# Patient Record
Sex: FEMALE | Race: WHITE | NOT HISPANIC OR LATINO | Employment: FULL TIME | URBAN - METROPOLITAN AREA
[De-identification: names, ages, dates, MRNs, and addresses within clinical notes are randomized per-mention and may not be internally consistent; named-entity substitution may affect disease eponyms.]

---

## 2017-02-23 ENCOUNTER — COMMUNICATION - HEALTHEAST (OUTPATIENT)
Dept: FAMILY MEDICINE | Facility: CLINIC | Age: 52
End: 2017-02-23

## 2017-04-10 ENCOUNTER — COMMUNICATION - HEALTHEAST (OUTPATIENT)
Dept: FAMILY MEDICINE | Facility: CLINIC | Age: 52
End: 2017-04-10

## 2017-06-14 ENCOUNTER — COMMUNICATION - HEALTHEAST (OUTPATIENT)
Dept: FAMILY MEDICINE | Facility: CLINIC | Age: 52
End: 2017-06-14

## 2017-06-14 DIAGNOSIS — E03.9 HYPOTHYROIDISM: ICD-10-CM

## 2017-06-20 ENCOUNTER — COMMUNICATION - HEALTHEAST (OUTPATIENT)
Dept: FAMILY MEDICINE | Facility: CLINIC | Age: 52
End: 2017-06-20

## 2017-07-03 ENCOUNTER — AMBULATORY - HEALTHEAST (OUTPATIENT)
Dept: LAB | Facility: CLINIC | Age: 52
End: 2017-07-03

## 2017-07-03 ENCOUNTER — AMBULATORY - HEALTHEAST (OUTPATIENT)
Dept: FAMILY MEDICINE | Facility: CLINIC | Age: 52
End: 2017-07-03

## 2017-07-03 DIAGNOSIS — E03.9 HYPOTHYROIDISM: ICD-10-CM

## 2017-09-18 ENCOUNTER — COMMUNICATION - HEALTHEAST (OUTPATIENT)
Dept: FAMILY MEDICINE | Facility: CLINIC | Age: 52
End: 2017-09-18

## 2017-09-18 DIAGNOSIS — E03.9 HYPOTHYROIDISM: ICD-10-CM

## 2017-10-06 ENCOUNTER — COMMUNICATION - HEALTHEAST (OUTPATIENT)
Dept: FAMILY MEDICINE | Facility: CLINIC | Age: 52
End: 2017-10-06

## 2017-10-08 ENCOUNTER — AMBULATORY - HEALTHEAST (OUTPATIENT)
Dept: FAMILY MEDICINE | Facility: CLINIC | Age: 52
End: 2017-10-08

## 2017-10-31 ENCOUNTER — OFFICE VISIT - HEALTHEAST (OUTPATIENT)
Dept: FAMILY MEDICINE | Facility: CLINIC | Age: 52
End: 2017-10-31

## 2017-10-31 DIAGNOSIS — N39.0 UTI (URINARY TRACT INFECTION): ICD-10-CM

## 2017-10-31 DIAGNOSIS — R30.0 DYSURIA: ICD-10-CM

## 2017-11-23 ENCOUNTER — COMMUNICATION - HEALTHEAST (OUTPATIENT)
Dept: FAMILY MEDICINE | Facility: CLINIC | Age: 52
End: 2017-11-23

## 2017-11-23 DIAGNOSIS — I10 ESSENTIAL HYPERTENSION: ICD-10-CM

## 2017-12-18 ENCOUNTER — COMMUNICATION - HEALTHEAST (OUTPATIENT)
Dept: FAMILY MEDICINE | Facility: CLINIC | Age: 52
End: 2017-12-18

## 2017-12-18 DIAGNOSIS — E03.9 HYPOTHYROIDISM: ICD-10-CM

## 2018-02-13 ENCOUNTER — AMBULATORY - HEALTHEAST (OUTPATIENT)
Dept: MULTI SPECIALTY CLINIC | Facility: CLINIC | Age: 53
End: 2018-02-13

## 2018-02-13 LAB
HPV_EXT - HISTORICAL: NORMAL
PAP SMEAR - HIM PATIENT REPORTED: NORMAL

## 2018-02-23 ENCOUNTER — COMMUNICATION - HEALTHEAST (OUTPATIENT)
Dept: FAMILY MEDICINE | Facility: CLINIC | Age: 53
End: 2018-02-23

## 2018-02-23 DIAGNOSIS — I10 ESSENTIAL HYPERTENSION: ICD-10-CM

## 2018-02-26 ENCOUNTER — COMMUNICATION - HEALTHEAST (OUTPATIENT)
Dept: FAMILY MEDICINE | Facility: CLINIC | Age: 53
End: 2018-02-26

## 2018-03-01 ENCOUNTER — OFFICE VISIT - HEALTHEAST (OUTPATIENT)
Dept: FAMILY MEDICINE | Facility: CLINIC | Age: 53
End: 2018-03-01

## 2018-03-01 DIAGNOSIS — N18.30 CHRONIC KIDNEY DISEASE, STAGE III (MODERATE) (H): ICD-10-CM

## 2018-03-01 DIAGNOSIS — I10 ESSENTIAL HYPERTENSION: ICD-10-CM

## 2018-03-01 DIAGNOSIS — E03.9 ACQUIRED HYPOTHYROIDISM: ICD-10-CM

## 2018-03-01 DIAGNOSIS — N39.0 RECURRENT UTI: ICD-10-CM

## 2018-03-01 DIAGNOSIS — Z00.00 ROUTINE GENERAL MEDICAL EXAMINATION AT A HEALTH CARE FACILITY: ICD-10-CM

## 2018-03-01 LAB
ALBUMIN SERPL-MCNC: 4.2 G/DL (ref 3.5–5)
ALBUMIN UR-MCNC: NEGATIVE MG/DL
ANION GAP SERPL CALCULATED.3IONS-SCNC: 10 MMOL/L (ref 5–18)
APPEARANCE UR: CLEAR
BACTERIA #/AREA URNS HPF: ABNORMAL HPF
BILIRUB UR QL STRIP: NEGATIVE
BUN SERPL-MCNC: 10 MG/DL (ref 8–22)
CALCIUM SERPL-MCNC: 9.9 MG/DL (ref 8.5–10.5)
CHLORIDE BLD-SCNC: 98 MMOL/L (ref 98–107)
CHOLEST SERPL-MCNC: 203 MG/DL
CO2 SERPL-SCNC: 27 MMOL/L (ref 22–31)
COLOR UR AUTO: YELLOW
CREAT SERPL-MCNC: 1.04 MG/DL (ref 0.6–1.1)
ERYTHROCYTE [DISTWIDTH] IN BLOOD BY AUTOMATED COUNT: 13.1 % (ref 11–14.5)
FASTING STATUS PATIENT QL REPORTED: YES
GFR SERPL CREATININE-BSD FRML MDRD: 56 ML/MIN/1.73M2
GLUCOSE BLD-MCNC: 99 MG/DL (ref 70–125)
GLUCOSE UR STRIP-MCNC: NEGATIVE MG/DL
HCT VFR BLD AUTO: 42.9 % (ref 35–47)
HDLC SERPL-MCNC: 57 MG/DL
HGB BLD-MCNC: 14.1 G/DL (ref 12–16)
HGB UR QL STRIP: ABNORMAL
KETONES UR STRIP-MCNC: NEGATIVE MG/DL
LDLC SERPL CALC-MCNC: 123 MG/DL
LEUKOCYTE ESTERASE UR QL STRIP: NEGATIVE
MCH RBC QN AUTO: 29.4 PG (ref 27–34)
MCHC RBC AUTO-ENTMCNC: 32.9 G/DL (ref 32–36)
MCV RBC AUTO: 89 FL (ref 80–100)
MUCOUS THREADS #/AREA URNS LPF: ABNORMAL LPF
NITRATE UR QL: NEGATIVE
PH UR STRIP: 7 [PH] (ref 5–8)
PHOSPHATE SERPL-MCNC: 2.6 MG/DL (ref 2.5–4.5)
PLATELET # BLD AUTO: 217 THOU/UL (ref 140–440)
PMV BLD AUTO: 8.8 FL (ref 7–10)
POTASSIUM BLD-SCNC: 3.9 MMOL/L (ref 3.5–5)
PTH-INTACT SERPL-MCNC: 28 PG/ML (ref 10–86)
RBC # BLD AUTO: 4.8 MILL/UL (ref 3.8–5.4)
RBC #/AREA URNS AUTO: ABNORMAL HPF
SODIUM SERPL-SCNC: 135 MMOL/L (ref 136–145)
SP GR UR STRIP: 1.01 (ref 1–1.03)
SQUAMOUS #/AREA URNS AUTO: ABNORMAL LPF
TRIGL SERPL-MCNC: 116 MG/DL
TSH SERPL DL<=0.005 MIU/L-ACNC: 4.66 UIU/ML (ref 0.3–5)
URATE SERPL-MCNC: 4.9 MG/DL (ref 2–7.5)
UROBILINOGEN UR STRIP-ACNC: ABNORMAL
WBC #/AREA URNS AUTO: ABNORMAL HPF
WBC: 3.9 THOU/UL (ref 4–11)

## 2018-03-01 ASSESSMENT — MIFFLIN-ST. JEOR: SCORE: 1551.73

## 2018-03-02 LAB
25(OH)D3 SERPL-MCNC: 45.7 NG/ML (ref 30–80)
25(OH)D3 SERPL-MCNC: 45.7 NG/ML (ref 30–80)
BACTERIA SPEC CULT: NO GROWTH

## 2018-04-20 ENCOUNTER — RECORDS - HEALTHEAST (OUTPATIENT)
Dept: ADMINISTRATIVE | Facility: OTHER | Age: 53
End: 2018-04-20

## 2018-04-23 ENCOUNTER — RECORDS - HEALTHEAST (OUTPATIENT)
Dept: ADMINISTRATIVE | Facility: OTHER | Age: 53
End: 2018-04-23

## 2018-05-21 ENCOUNTER — AMBULATORY - HEALTHEAST (OUTPATIENT)
Dept: LAB | Facility: CLINIC | Age: 53
End: 2018-05-21

## 2018-05-21 ENCOUNTER — COMMUNICATION - HEALTHEAST (OUTPATIENT)
Dept: FAMILY MEDICINE | Facility: CLINIC | Age: 53
End: 2018-05-21

## 2018-05-21 DIAGNOSIS — N39.0 RECURRENT UTI: ICD-10-CM

## 2018-05-21 LAB
ALBUMIN UR-MCNC: ABNORMAL MG/DL
APPEARANCE UR: CLEAR
BACTERIA #/AREA URNS HPF: ABNORMAL HPF
BILIRUB UR QL STRIP: NEGATIVE
COLOR UR AUTO: YELLOW
GLUCOSE UR STRIP-MCNC: NEGATIVE MG/DL
HGB UR QL STRIP: ABNORMAL
KETONES UR STRIP-MCNC: NEGATIVE MG/DL
LEUKOCYTE ESTERASE UR QL STRIP: ABNORMAL
NITRATE UR QL: NEGATIVE
PH UR STRIP: 6 [PH] (ref 5–8)
RBC #/AREA URNS AUTO: ABNORMAL HPF
SP GR UR STRIP: <=1.005 (ref 1–1.03)
SQUAMOUS #/AREA URNS AUTO: ABNORMAL LPF
UROBILINOGEN UR STRIP-ACNC: ABNORMAL
WBC #/AREA URNS AUTO: ABNORMAL HPF

## 2018-05-23 LAB — BACTERIA SPEC CULT: ABNORMAL

## 2018-05-29 ENCOUNTER — COMMUNICATION - HEALTHEAST (OUTPATIENT)
Dept: FAMILY MEDICINE | Facility: CLINIC | Age: 53
End: 2018-05-29

## 2018-05-29 DIAGNOSIS — I10 ESSENTIAL HYPERTENSION: ICD-10-CM

## 2018-07-15 ENCOUNTER — OFFICE VISIT - HEALTHEAST (OUTPATIENT)
Dept: FAMILY MEDICINE | Facility: CLINIC | Age: 53
End: 2018-07-15

## 2018-07-15 DIAGNOSIS — R30.0 DYSURIA: ICD-10-CM

## 2018-07-15 DIAGNOSIS — N39.0 URINARY TRACT INFECTION: ICD-10-CM

## 2018-07-15 LAB
ALBUMIN UR-MCNC: ABNORMAL MG/DL
APPEARANCE UR: CLEAR
BACTERIA #/AREA URNS HPF: ABNORMAL HPF
BILIRUB UR QL STRIP: NEGATIVE
COLOR UR AUTO: YELLOW
GLUCOSE UR STRIP-MCNC: NEGATIVE MG/DL
HGB UR QL STRIP: ABNORMAL
KETONES UR STRIP-MCNC: NEGATIVE MG/DL
LEUKOCYTE ESTERASE UR QL STRIP: ABNORMAL
NITRATE UR QL: NEGATIVE
PH UR STRIP: 7 [PH] (ref 5–8)
RBC #/AREA URNS AUTO: ABNORMAL HPF
SP GR UR STRIP: 1.01 (ref 1–1.03)
SQUAMOUS #/AREA URNS AUTO: ABNORMAL LPF
UROBILINOGEN UR STRIP-ACNC: ABNORMAL
WBC #/AREA URNS AUTO: ABNORMAL HPF

## 2018-07-16 LAB — BACTERIA SPEC CULT: ABNORMAL

## 2018-10-09 ENCOUNTER — AMBULATORY - HEALTHEAST (OUTPATIENT)
Dept: FAMILY MEDICINE | Facility: CLINIC | Age: 53
End: 2018-10-09

## 2018-10-09 ENCOUNTER — AMBULATORY - HEALTHEAST (OUTPATIENT)
Dept: LAB | Facility: CLINIC | Age: 53
End: 2018-10-09

## 2018-10-09 ENCOUNTER — COMMUNICATION - HEALTHEAST (OUTPATIENT)
Dept: FAMILY MEDICINE | Facility: CLINIC | Age: 53
End: 2018-10-09

## 2018-10-09 DIAGNOSIS — N39.0 RECURRENT UTI: ICD-10-CM

## 2018-10-09 DIAGNOSIS — N39.0 URINARY TRACT INFECTION: ICD-10-CM

## 2018-10-09 DIAGNOSIS — R30.0 DYSURIA: ICD-10-CM

## 2018-10-09 LAB
ALBUMIN UR-MCNC: ABNORMAL MG/DL
APPEARANCE UR: CLEAR
BACTERIA #/AREA URNS HPF: ABNORMAL HPF
BILIRUB UR QL STRIP: NEGATIVE
COLOR UR AUTO: YELLOW
GLUCOSE UR STRIP-MCNC: NEGATIVE MG/DL
HGB UR QL STRIP: ABNORMAL
KETONES UR STRIP-MCNC: NEGATIVE MG/DL
LEUKOCYTE ESTERASE UR QL STRIP: ABNORMAL
NITRATE UR QL: NEGATIVE
PH UR STRIP: 7.5 [PH] (ref 5–8)
RBC #/AREA URNS AUTO: ABNORMAL HPF
SP GR UR STRIP: 1.02 (ref 1–1.03)
SQUAMOUS #/AREA URNS AUTO: ABNORMAL LPF
UROBILINOGEN UR STRIP-ACNC: ABNORMAL
WBC #/AREA URNS AUTO: >100 HPF
WBC CLUMPS #/AREA URNS HPF: PRESENT /[HPF]

## 2018-10-10 LAB — BACTERIA SPEC CULT: ABNORMAL

## 2018-10-24 ENCOUNTER — COMMUNICATION - HEALTHEAST (OUTPATIENT)
Dept: FAMILY MEDICINE | Facility: CLINIC | Age: 53
End: 2018-10-24

## 2018-10-24 DIAGNOSIS — E03.9 HYPOTHYROIDISM: ICD-10-CM

## 2019-01-21 ENCOUNTER — COMMUNICATION - HEALTHEAST (OUTPATIENT)
Dept: FAMILY MEDICINE | Facility: CLINIC | Age: 54
End: 2019-01-21

## 2019-01-21 DIAGNOSIS — E03.9 HYPOTHYROIDISM: ICD-10-CM

## 2019-02-16 ENCOUNTER — COMMUNICATION - HEALTHEAST (OUTPATIENT)
Dept: FAMILY MEDICINE | Facility: CLINIC | Age: 54
End: 2019-02-16

## 2019-02-16 DIAGNOSIS — I10 ESSENTIAL HYPERTENSION: ICD-10-CM

## 2019-04-10 ENCOUNTER — OFFICE VISIT - HEALTHEAST (OUTPATIENT)
Dept: FAMILY MEDICINE | Facility: CLINIC | Age: 54
End: 2019-04-10

## 2019-04-10 DIAGNOSIS — J01.00 ACUTE NON-RECURRENT MAXILLARY SINUSITIS: ICD-10-CM

## 2019-04-10 ASSESSMENT — MIFFLIN-ST. JEOR: SCORE: 1537.1

## 2019-04-11 ENCOUNTER — AMBULATORY - HEALTHEAST (OUTPATIENT)
Dept: MULTI SPECIALTY CLINIC | Facility: CLINIC | Age: 54
End: 2019-04-11

## 2019-04-18 ENCOUNTER — COMMUNICATION - HEALTHEAST (OUTPATIENT)
Dept: FAMILY MEDICINE | Facility: CLINIC | Age: 54
End: 2019-04-18

## 2019-04-18 DIAGNOSIS — E03.9 HYPOTHYROIDISM: ICD-10-CM

## 2019-04-22 ENCOUNTER — COMMUNICATION - HEALTHEAST (OUTPATIENT)
Dept: FAMILY MEDICINE | Facility: CLINIC | Age: 54
End: 2019-04-22

## 2019-05-17 ENCOUNTER — COMMUNICATION - HEALTHEAST (OUTPATIENT)
Dept: FAMILY MEDICINE | Facility: CLINIC | Age: 54
End: 2019-05-17

## 2019-05-17 DIAGNOSIS — I10 ESSENTIAL HYPERTENSION: ICD-10-CM

## 2019-06-18 ENCOUNTER — OFFICE VISIT - HEALTHEAST (OUTPATIENT)
Dept: FAMILY MEDICINE | Facility: CLINIC | Age: 54
End: 2019-06-18

## 2019-06-18 DIAGNOSIS — N39.0 URINARY TRACT INFECTION: ICD-10-CM

## 2019-06-18 LAB
ALBUMIN UR-MCNC: ABNORMAL MG/DL
APPEARANCE UR: ABNORMAL
BACTERIA #/AREA URNS HPF: ABNORMAL HPF
BILIRUB UR QL STRIP: NEGATIVE
COLOR UR AUTO: YELLOW
GLUCOSE UR STRIP-MCNC: NEGATIVE MG/DL
HGB UR QL STRIP: ABNORMAL
KETONES UR STRIP-MCNC: NEGATIVE MG/DL
LEUKOCYTE ESTERASE UR QL STRIP: ABNORMAL
NITRATE UR QL: NEGATIVE
PH UR STRIP: 7 [PH] (ref 5–8)
RBC #/AREA URNS AUTO: ABNORMAL HPF
SP GR UR STRIP: 1.01 (ref 1–1.03)
SQUAMOUS #/AREA URNS AUTO: ABNORMAL LPF
UROBILINOGEN UR STRIP-ACNC: ABNORMAL
WBC #/AREA URNS AUTO: ABNORMAL HPF
WBC CLUMPS #/AREA URNS HPF: PRESENT /[HPF]
YEAST #/AREA URNS HPF: ABNORMAL HPF

## 2019-06-19 LAB — BACTERIA SPEC CULT: NO GROWTH

## 2019-07-08 ENCOUNTER — OFFICE VISIT - HEALTHEAST (OUTPATIENT)
Dept: FAMILY MEDICINE | Facility: CLINIC | Age: 54
End: 2019-07-08

## 2019-07-08 ENCOUNTER — COMMUNICATION - HEALTHEAST (OUTPATIENT)
Dept: FAMILY MEDICINE | Facility: CLINIC | Age: 54
End: 2019-07-08

## 2019-07-08 DIAGNOSIS — N39.0 RECURRENT UTI: ICD-10-CM

## 2019-07-08 DIAGNOSIS — I10 ESSENTIAL HYPERTENSION: ICD-10-CM

## 2019-07-08 DIAGNOSIS — N18.30 CHRONIC KIDNEY DISEASE, STAGE III (MODERATE) (H): ICD-10-CM

## 2019-07-08 DIAGNOSIS — E03.9 ACQUIRED HYPOTHYROIDISM: ICD-10-CM

## 2019-07-08 LAB
ALBUMIN SERPL-MCNC: 4.3 G/DL (ref 3.5–5)
ALBUMIN UR-MCNC: NEGATIVE MG/DL
ANION GAP SERPL CALCULATED.3IONS-SCNC: 15 MMOL/L (ref 5–18)
APPEARANCE UR: CLEAR
BACTERIA #/AREA URNS HPF: ABNORMAL HPF
BILIRUB UR QL STRIP: NEGATIVE
BUN SERPL-MCNC: 18 MG/DL (ref 8–22)
CALCIUM SERPL-MCNC: 10.1 MG/DL (ref 8.5–10.5)
CHLORIDE BLD-SCNC: 104 MMOL/L (ref 98–107)
CO2 SERPL-SCNC: 24 MMOL/L (ref 22–31)
COLOR UR AUTO: YELLOW
CREAT SERPL-MCNC: 1.01 MG/DL (ref 0.6–1.1)
ERYTHROCYTE [DISTWIDTH] IN BLOOD BY AUTOMATED COUNT: 12.3 % (ref 11–14.5)
GFR SERPL CREATININE-BSD FRML MDRD: 58 ML/MIN/1.73M2
GLUCOSE BLD-MCNC: 97 MG/DL (ref 70–125)
GLUCOSE UR STRIP-MCNC: NEGATIVE MG/DL
HBA1C MFR BLD: 5.4 % (ref 3.5–6)
HCT VFR BLD AUTO: 42.2 % (ref 35–47)
HGB BLD-MCNC: 13.9 G/DL (ref 12–16)
HGB UR QL STRIP: ABNORMAL
KETONES UR STRIP-MCNC: NEGATIVE MG/DL
LEUKOCYTE ESTERASE UR QL STRIP: NEGATIVE
MCH RBC QN AUTO: 29.8 PG (ref 27–34)
MCHC RBC AUTO-ENTMCNC: 33 G/DL (ref 32–36)
MCV RBC AUTO: 90 FL (ref 80–100)
NITRATE UR QL: NEGATIVE
PH UR STRIP: 7 [PH] (ref 5–8)
PHOSPHATE SERPL-MCNC: 3.7 MG/DL (ref 2.5–4.5)
PLATELET # BLD AUTO: 217 THOU/UL (ref 140–440)
PMV BLD AUTO: 8.7 FL (ref 7–10)
POTASSIUM BLD-SCNC: 4.1 MMOL/L (ref 3.5–5)
PTH-INTACT SERPL-MCNC: 43 PG/ML (ref 10–86)
RBC # BLD AUTO: 4.66 MILL/UL (ref 3.8–5.4)
RBC #/AREA URNS AUTO: ABNORMAL HPF
SODIUM SERPL-SCNC: 143 MMOL/L (ref 136–145)
SP GR UR STRIP: 1.01 (ref 1–1.03)
SQUAMOUS #/AREA URNS AUTO: ABNORMAL LPF
TRANS CELLS #/AREA URNS HPF: ABNORMAL LPF
TSH SERPL DL<=0.005 MIU/L-ACNC: 2.79 UIU/ML (ref 0.3–5)
URATE SERPL-MCNC: 5.5 MG/DL (ref 2–7.5)
UROBILINOGEN UR STRIP-ACNC: ABNORMAL
WBC #/AREA URNS AUTO: ABNORMAL HPF
WBC: 4.2 THOU/UL (ref 4–11)

## 2019-07-08 ASSESSMENT — MIFFLIN-ST. JEOR: SCORE: 1557.97

## 2019-07-09 LAB
25(OH)D3 SERPL-MCNC: 33.9 NG/ML (ref 30–80)
25(OH)D3 SERPL-MCNC: 33.9 NG/ML (ref 30–80)

## 2019-07-14 ENCOUNTER — COMMUNICATION - HEALTHEAST (OUTPATIENT)
Dept: FAMILY MEDICINE | Facility: CLINIC | Age: 54
End: 2019-07-14

## 2019-07-14 DIAGNOSIS — E03.9 HYPOTHYROIDISM: ICD-10-CM

## 2019-07-19 ENCOUNTER — COMMUNICATION - HEALTHEAST (OUTPATIENT)
Dept: SCHEDULING | Facility: CLINIC | Age: 54
End: 2019-07-19

## 2019-07-19 ENCOUNTER — OFFICE VISIT - HEALTHEAST (OUTPATIENT)
Dept: FAMILY MEDICINE | Facility: CLINIC | Age: 54
End: 2019-07-19

## 2019-07-19 DIAGNOSIS — K52.9 COLITIS: ICD-10-CM

## 2019-07-19 DIAGNOSIS — R55 PRE-SYNCOPE: ICD-10-CM

## 2019-07-19 DIAGNOSIS — K92.1 BLOODY STOOLS: ICD-10-CM

## 2019-07-19 LAB
BASOPHILS # BLD AUTO: 0.1 THOU/UL (ref 0–0.2)
BASOPHILS NFR BLD AUTO: 1 % (ref 0–2)
EOSINOPHIL # BLD AUTO: 0.1 THOU/UL (ref 0–0.4)
EOSINOPHIL NFR BLD AUTO: 1 % (ref 0–6)
ERYTHROCYTE [DISTWIDTH] IN BLOOD BY AUTOMATED COUNT: 12 % (ref 11–14.5)
HCT VFR BLD AUTO: 41.8 % (ref 35–47)
HEMOCCULT SP1 STL QL: POSITIVE
HGB BLD-MCNC: 14 G/DL (ref 12–16)
LYMPHOCYTES # BLD AUTO: 1.4 THOU/UL (ref 0.8–4.4)
LYMPHOCYTES NFR BLD AUTO: 14 % (ref 20–40)
MCH RBC QN AUTO: 29.7 PG (ref 27–34)
MCHC RBC AUTO-ENTMCNC: 33.5 G/DL (ref 32–36)
MCV RBC AUTO: 89 FL (ref 80–100)
MONOCYTES # BLD AUTO: 0.5 THOU/UL (ref 0–0.9)
MONOCYTES NFR BLD AUTO: 5 % (ref 2–10)
NEUTROPHILS # BLD AUTO: 7.8 THOU/UL (ref 2–7.7)
NEUTROPHILS NFR BLD AUTO: 79 % (ref 50–70)
PLATELET # BLD AUTO: 219 THOU/UL (ref 140–440)
PMV BLD AUTO: 7.7 FL (ref 7–10)
RBC # BLD AUTO: 4.71 MILL/UL (ref 3.8–5.4)
WBC: 9.9 THOU/UL (ref 4–11)

## 2019-07-23 ENCOUNTER — COMMUNICATION - HEALTHEAST (OUTPATIENT)
Dept: SCHEDULING | Facility: CLINIC | Age: 54
End: 2019-07-23

## 2019-07-24 ENCOUNTER — RECORDS - HEALTHEAST (OUTPATIENT)
Dept: ADMINISTRATIVE | Facility: OTHER | Age: 54
End: 2019-07-24

## 2019-08-16 ENCOUNTER — COMMUNICATION - HEALTHEAST (OUTPATIENT)
Dept: FAMILY MEDICINE | Facility: CLINIC | Age: 54
End: 2019-08-16

## 2019-08-16 DIAGNOSIS — I10 ESSENTIAL HYPERTENSION: ICD-10-CM

## 2019-11-29 ENCOUNTER — COMMUNICATION - HEALTHEAST (OUTPATIENT)
Dept: SCHEDULING | Facility: CLINIC | Age: 54
End: 2019-11-29

## 2019-11-29 ENCOUNTER — OFFICE VISIT - HEALTHEAST (OUTPATIENT)
Dept: FAMILY MEDICINE | Facility: CLINIC | Age: 54
End: 2019-11-29

## 2019-11-29 DIAGNOSIS — R10.9 FLANK PAIN: ICD-10-CM

## 2019-11-29 DIAGNOSIS — I10 ESSENTIAL HYPERTENSION: ICD-10-CM

## 2019-11-29 LAB
ALBUMIN SERPL-MCNC: 4.5 G/DL (ref 3.5–5)
ALBUMIN UR-MCNC: NEGATIVE MG/DL
ALP SERPL-CCNC: 85 U/L (ref 45–120)
ALT SERPL W P-5'-P-CCNC: 19 U/L (ref 0–45)
ANION GAP SERPL CALCULATED.3IONS-SCNC: 13 MMOL/L (ref 5–18)
APPEARANCE UR: CLEAR
AST SERPL W P-5'-P-CCNC: 22 U/L (ref 0–40)
BASOPHILS # BLD AUTO: 0 THOU/UL (ref 0–0.2)
BASOPHILS NFR BLD AUTO: 0 % (ref 0–2)
BILIRUB SERPL-MCNC: 0.6 MG/DL (ref 0–1)
BILIRUB UR QL STRIP: NEGATIVE
BUN SERPL-MCNC: 16 MG/DL (ref 8–22)
CALCIUM SERPL-MCNC: 10.3 MG/DL (ref 8.5–10.5)
CHLORIDE BLD-SCNC: 99 MMOL/L (ref 98–107)
CO2 SERPL-SCNC: 27 MMOL/L (ref 22–31)
COLOR UR AUTO: YELLOW
CREAT SERPL-MCNC: 1.19 MG/DL (ref 0.6–1.1)
EOSINOPHIL # BLD AUTO: 0.2 THOU/UL (ref 0–0.4)
EOSINOPHIL NFR BLD AUTO: 3 % (ref 0–6)
ERYTHROCYTE [DISTWIDTH] IN BLOOD BY AUTOMATED COUNT: 13.2 % (ref 11–14.5)
GFR SERPL CREATININE-BSD FRML MDRD: 48 ML/MIN/1.73M2
GLUCOSE BLD-MCNC: 98 MG/DL (ref 70–125)
GLUCOSE UR STRIP-MCNC: NEGATIVE MG/DL
HCT VFR BLD AUTO: 41.5 % (ref 35–47)
HGB BLD-MCNC: 13.8 G/DL (ref 12–16)
HGB UR QL STRIP: NEGATIVE
KETONES UR STRIP-MCNC: NEGATIVE MG/DL
LEUKOCYTE ESTERASE UR QL STRIP: NEGATIVE
LIPASE SERPL-CCNC: 12 U/L (ref 0–52)
LYMPHOCYTES # BLD AUTO: 1.5 THOU/UL (ref 0.8–4.4)
LYMPHOCYTES NFR BLD AUTO: 27 % (ref 20–40)
MCH RBC QN AUTO: 29.5 PG (ref 27–34)
MCHC RBC AUTO-ENTMCNC: 33.3 G/DL (ref 32–36)
MCV RBC AUTO: 88 FL (ref 80–100)
MONOCYTES # BLD AUTO: 0.4 THOU/UL (ref 0–0.9)
MONOCYTES NFR BLD AUTO: 6 % (ref 2–10)
NEUTROPHILS # BLD AUTO: 3.7 THOU/UL (ref 2–7.7)
NEUTROPHILS NFR BLD AUTO: 64 % (ref 50–70)
NITRATE UR QL: NEGATIVE
PH UR STRIP: 6.5 [PH] (ref 5–8)
PLATELET # BLD AUTO: 224 THOU/UL (ref 140–440)
PMV BLD AUTO: 8.1 FL (ref 7–10)
POTASSIUM BLD-SCNC: 4.3 MMOL/L (ref 3.5–5)
PROT SERPL-MCNC: 7.6 G/DL (ref 6–8)
RBC # BLD AUTO: 4.7 MILL/UL (ref 3.8–5.4)
SODIUM SERPL-SCNC: 139 MMOL/L (ref 136–145)
SP GR UR STRIP: 1.01 (ref 1–1.03)
UROBILINOGEN UR STRIP-ACNC: NORMAL
WBC: 5.8 THOU/UL (ref 4–11)

## 2019-12-10 ENCOUNTER — COMMUNICATION - HEALTHEAST (OUTPATIENT)
Dept: LAB | Facility: CLINIC | Age: 54
End: 2019-12-10

## 2019-12-10 ENCOUNTER — AMBULATORY - HEALTHEAST (OUTPATIENT)
Dept: LAB | Facility: CLINIC | Age: 54
End: 2019-12-10

## 2019-12-10 DIAGNOSIS — R10.9 FLANK PAIN: ICD-10-CM

## 2019-12-11 LAB
ALBUMIN SERPL-MCNC: 4.1 G/DL (ref 3.5–5)
ALP SERPL-CCNC: 101 U/L (ref 45–120)
ALT SERPL W P-5'-P-CCNC: 14 U/L (ref 0–45)
ANION GAP SERPL CALCULATED.3IONS-SCNC: 11 MMOL/L (ref 5–18)
AST SERPL W P-5'-P-CCNC: 26 U/L (ref 0–40)
BILIRUB SERPL-MCNC: 0.3 MG/DL (ref 0–1)
BUN SERPL-MCNC: 13 MG/DL (ref 8–22)
CALCIUM SERPL-MCNC: 9.5 MG/DL (ref 8.5–10.5)
CHLORIDE BLD-SCNC: 99 MMOL/L (ref 98–107)
CO2 SERPL-SCNC: 29 MMOL/L (ref 22–31)
CREAT SERPL-MCNC: 1.13 MG/DL (ref 0.6–1.1)
GFR SERPL CREATININE-BSD FRML MDRD: 51 ML/MIN/1.73M2
GLUCOSE BLD-MCNC: 98 MG/DL (ref 70–125)
POTASSIUM BLD-SCNC: 3.8 MMOL/L (ref 3.5–5)
PROT SERPL-MCNC: 7.3 G/DL (ref 6–8)
SODIUM SERPL-SCNC: 139 MMOL/L (ref 136–145)

## 2020-03-11 ENCOUNTER — OFFICE VISIT - HEALTHEAST (OUTPATIENT)
Dept: FAMILY MEDICINE | Facility: CLINIC | Age: 55
End: 2020-03-11

## 2020-03-11 ENCOUNTER — RECORDS - HEALTHEAST (OUTPATIENT)
Dept: GENERAL RADIOLOGY | Facility: CLINIC | Age: 55
End: 2020-03-11

## 2020-03-11 DIAGNOSIS — M54.12 CERVICAL RADICULOPATHY: ICD-10-CM

## 2020-03-11 DIAGNOSIS — N39.0 RECURRENT UTI: ICD-10-CM

## 2020-03-11 DIAGNOSIS — Z12.31 VISIT FOR SCREENING MAMMOGRAM: ICD-10-CM

## 2020-03-11 DIAGNOSIS — M54.12 RADICULOPATHY, CERVICAL REGION: ICD-10-CM

## 2020-03-11 DIAGNOSIS — M62.830 BACK MUSCLE SPASM: ICD-10-CM

## 2020-03-11 RX ORDER — CIPROFLOXACIN 500 MG/1
500 TABLET, FILM COATED ORAL 2 TIMES DAILY
Qty: 20 TABLET | Refills: 2 | Status: SHIPPED | OUTPATIENT
Start: 2020-03-11 | End: 2021-08-16

## 2020-03-11 RX ORDER — CYCLOBENZAPRINE HCL 5 MG
5 TABLET ORAL 3 TIMES DAILY PRN
Qty: 30 TABLET | Refills: 0 | Status: SHIPPED | OUTPATIENT
Start: 2020-03-11 | End: 2021-12-06

## 2020-04-08 ENCOUNTER — COMMUNICATION - HEALTHEAST (OUTPATIENT)
Dept: FAMILY MEDICINE | Facility: CLINIC | Age: 55
End: 2020-04-08

## 2020-04-13 ENCOUNTER — COMMUNICATION - HEALTHEAST (OUTPATIENT)
Dept: FAMILY MEDICINE | Facility: CLINIC | Age: 55
End: 2020-04-13

## 2020-06-08 ENCOUNTER — HOSPITAL ENCOUNTER (OUTPATIENT)
Dept: MAMMOGRAPHY | Facility: CLINIC | Age: 55
Discharge: HOME | End: 2020-06-08

## 2020-06-08 DIAGNOSIS — Z12.31 VISIT FOR SCREENING MAMMOGRAM: ICD-10-CM

## 2020-07-18 ENCOUNTER — COMMUNICATION - HEALTHEAST (OUTPATIENT)
Dept: FAMILY MEDICINE | Facility: CLINIC | Age: 55
End: 2020-07-18

## 2020-07-18 DIAGNOSIS — E03.9 HYPOTHYROIDISM: ICD-10-CM

## 2020-07-19 RX ORDER — LEVOTHYROXINE SODIUM 100 UG/1
TABLET ORAL
Qty: 90 TABLET | Refills: 3 | Status: SHIPPED | OUTPATIENT
Start: 2020-07-19 | End: 2021-07-27

## 2020-07-20 ENCOUNTER — OFFICE VISIT - HEALTHEAST (OUTPATIENT)
Dept: FAMILY MEDICINE | Facility: CLINIC | Age: 55
End: 2020-07-20

## 2020-07-20 DIAGNOSIS — E03.9 ACQUIRED HYPOTHYROIDISM: ICD-10-CM

## 2020-07-20 DIAGNOSIS — I10 ESSENTIAL HYPERTENSION: ICD-10-CM

## 2020-07-20 DIAGNOSIS — N18.30 CHRONIC KIDNEY DISEASE, STAGE III (MODERATE) (H): ICD-10-CM

## 2020-07-20 DIAGNOSIS — N39.0 RECURRENT UTI: ICD-10-CM

## 2020-07-20 ASSESSMENT — PATIENT HEALTH QUESTIONNAIRE - PHQ9: SUM OF ALL RESPONSES TO PHQ QUESTIONS 1-9: 0

## 2020-07-22 ENCOUNTER — AMBULATORY - HEALTHEAST (OUTPATIENT)
Dept: NURSING | Facility: CLINIC | Age: 55
End: 2020-07-22

## 2020-07-22 ENCOUNTER — AMBULATORY - HEALTHEAST (OUTPATIENT)
Dept: LAB | Facility: CLINIC | Age: 55
End: 2020-07-22

## 2020-07-22 DIAGNOSIS — N18.30 CHRONIC KIDNEY DISEASE, STAGE III (MODERATE) (H): ICD-10-CM

## 2020-07-22 DIAGNOSIS — E03.9 ACQUIRED HYPOTHYROIDISM: ICD-10-CM

## 2020-07-22 LAB
ALBUMIN SERPL-MCNC: 4.3 G/DL (ref 3.5–5)
ANION GAP SERPL CALCULATED.3IONS-SCNC: 10 MMOL/L (ref 5–18)
BUN SERPL-MCNC: 15 MG/DL (ref 8–22)
CALCIUM SERPL-MCNC: 10.1 MG/DL (ref 8.5–10.5)
CHLORIDE BLD-SCNC: 100 MMOL/L (ref 98–107)
CHOLEST SERPL-MCNC: 238 MG/DL
CO2 SERPL-SCNC: 29 MMOL/L (ref 22–31)
CREAT SERPL-MCNC: 1.08 MG/DL (ref 0.6–1.1)
CREAT UR-MCNC: 94.7 MG/DL
FASTING STATUS PATIENT QL REPORTED: YES
GFR SERPL CREATININE-BSD FRML MDRD: 53 ML/MIN/1.73M2
GLUCOSE BLD-MCNC: 91 MG/DL (ref 70–125)
HBA1C MFR BLD: 5.4 % (ref 3.5–6)
HDLC SERPL-MCNC: 67 MG/DL
LDLC SERPL CALC-MCNC: 147 MG/DL
MICROALBUMIN UR-MCNC: <0.5 MG/DL (ref 0–1.99)
MICROALBUMIN/CREAT UR: NORMAL MG/G{CREAT}
PHOSPHATE SERPL-MCNC: 3.9 MG/DL (ref 2.5–4.5)
POTASSIUM BLD-SCNC: 3.8 MMOL/L (ref 3.5–5)
PTH-INTACT SERPL-MCNC: 41 PG/ML (ref 10–86)
SODIUM SERPL-SCNC: 139 MMOL/L (ref 136–145)
TRIGL SERPL-MCNC: 121 MG/DL
TSH SERPL DL<=0.005 MIU/L-ACNC: 3.45 UIU/ML (ref 0.3–5)
URATE SERPL-MCNC: 5.8 MG/DL (ref 2–7.5)

## 2020-07-23 LAB
25(OH)D3 SERPL-MCNC: 42.7 NG/ML (ref 30–80)
25(OH)D3 SERPL-MCNC: 42.7 NG/ML (ref 30–80)

## 2020-08-08 ENCOUNTER — COMMUNICATION - HEALTHEAST (OUTPATIENT)
Dept: FAMILY MEDICINE | Facility: CLINIC | Age: 55
End: 2020-08-08

## 2020-08-08 DIAGNOSIS — I10 ESSENTIAL HYPERTENSION: ICD-10-CM

## 2020-09-16 ENCOUNTER — COMMUNICATION - HEALTHEAST (OUTPATIENT)
Dept: FAMILY MEDICINE | Facility: CLINIC | Age: 55
End: 2020-09-16

## 2021-03-21 ENCOUNTER — COMMUNICATION - HEALTHEAST (OUTPATIENT)
Dept: FAMILY MEDICINE | Facility: CLINIC | Age: 56
End: 2021-03-21

## 2021-04-26 ENCOUNTER — COMMUNICATION - HEALTHEAST (OUTPATIENT)
Dept: PHARMACY | Facility: CLINIC | Age: 56
End: 2021-04-26

## 2021-04-26 DIAGNOSIS — I10 ESSENTIAL HYPERTENSION: ICD-10-CM

## 2021-04-26 RX ORDER — LISINOPRIL AND HYDROCHLOROTHIAZIDE 20; 25 MG/1; MG/1
1 TABLET ORAL DAILY
Qty: 90 TABLET | Refills: 0 | Status: SHIPPED | OUTPATIENT
Start: 2021-04-26 | End: 2021-07-27

## 2021-05-26 VITALS — SYSTOLIC BLOOD PRESSURE: 128 MMHG | DIASTOLIC BLOOD PRESSURE: 82 MMHG

## 2021-05-26 ASSESSMENT — PATIENT HEALTH QUESTIONNAIRE - PHQ9: SUM OF ALL RESPONSES TO PHQ QUESTIONS 1-9: 0

## 2021-05-27 ENCOUNTER — RECORDS - HEALTHEAST (OUTPATIENT)
Dept: ADMINISTRATIVE | Facility: CLINIC | Age: 56
End: 2021-05-27

## 2021-05-27 NOTE — PROGRESS NOTES
Assessment/Plan:    1. Acute non-recurrent maxillary sinusitis  Will treat sinusitis with 10-day course of Augmentin.  She may continue with over-the-counter medications of these provide relief.  Encouraged adequate rest and hydration.  Discussed signs and symptoms of worsening infection to monitor for including worsening nasal congestion, headaches, fevers, productive cough.  Return to clinic with any new, persisting or worsening symptoms.  - amoxicillin-clavulanate (AUGMENTIN) 875-125 mg per tablet; Take 1 tablet by mouth 2 (two) times a day for 10 days.  Dispense: 20 tablet; Refill: 0    Subjective:    Erika Obrien is a 54-year-old female seen today for evaluation of nasal congestion and sinus pain.  Patient came down with cold-like symptoms over 2 weeks ago.  Initially thought she was able to push through it however symptoms persisted.  She is now having a dull pain in her teeth and continued sinus pressure and pain.  Her left ear felt plugged and she had muffled hearing.  She continues to have significant nasal congestion despite using over-the-counter medications including Advil cold/sinus.  She has a mild cough but is fairly sure this is from postnasal drip.  She feels a significant amount of drainage going down the back of her throat.  She is not having any shortness of breath or chest pain.  She feels that symptoms have taken a turn for the worse over the past couple of days.  She felt feverish at the beginning of the illness however no known elevated temperatures.  She does not have any chills or body aches.  No additional concerns today. Review of systems is as stated in HPI, and the remainder of the 10 system review is otherwise unremarkable.    Past Medical History, Family History, and Social History reviewed.    Past Surgical History:   Procedure Laterality Date     [place] LIGATE FALLOPIAN TUBE      Description: Tubal Ligation;  Recorded: 12/27/2007;  Annotations: 1995     [place] THYROIDECTOMY       Description: Near-Total Thyroidectomy;  Recorded: 09/17/2008;  Comments: due to Graves     TUBAL LIGATION       uterine ablation          Family History   Problem Relation Age of Onset     Cancer Mother         lung     Heart disease Mother      Cancer Father         lung        Past Medical History:   Diagnosis Date     Bladder infection      Hypertension      Hypothyroidism      Kidney stone         Social History     Tobacco Use     Smoking status: Never Smoker     Smokeless tobacco: Never Used   Substance Use Topics     Alcohol use: Yes     Alcohol/week: 2.0 oz     Types: 4 Standard drinks or equivalent per week     Drug use: Not on file        Current Outpatient Medications   Medication Sig Dispense Refill     ascorbic acid (VITAMIN C) 1000 MG tablet Take 1,000 mg by mouth 3 (three) times a day.       calcium carbonate (OS-KAMRON) 600 mg (1,500 mg) tablet Take 600 mg by mouth daily.       cholecalciferol, vitamin D3, (VITAMIN D3) 1,000 unit capsule Take 1,000 Units by mouth daily.       levothyroxine (SYNTHROID, LEVOTHROID) 100 MCG tablet TAKE 1 TABLET BY MOUTH DAILY AT 6 AM 90 tablet 0     lisinopril-hydrochlorothiazide (PRINZIDE,ZESTORETIC) 20-25 mg per tablet TAKE 1 TABLET BY MOUTH DAILY 90 tablet 0     multivitamin (ONE A DAY) per tablet Take 1 tablet by mouth daily.       OMEGA-3/DHA/EPA/FISH OIL (FISH OIL-OMEGA-3 FATTY ACIDS) 300-1,000 mg capsule Take 2 g by mouth daily.       PREVIDENT 5000 SENSITIVE 1.1-5 % Pste BRUSH WITH A PEA SIZED AMOUNT 2 XD UTD  2     amoxicillin-clavulanate (AUGMENTIN) 875-125 mg per tablet Take 1 tablet by mouth 2 (two) times a day for 10 days. 20 tablet 0     ciprofloxacin HCl (CIPRO) 500 MG tablet Take 1 tablet (500 mg total) by mouth 2 (two) times a day. 20 tablet 2     No current facility-administered medications for this visit.           Objective:    Vitals:    04/10/19 1406   BP: 138/82   Patient Site: Right Arm   Patient Position: Sitting   Cuff Size: Adult Regular   Pulse:  "80   Temp: 97.6  F (36.4  C)   Weight: 199 lb 6.4 oz (90.4 kg)   Height: 5' 7\" (1.702 m)      Body mass index is 31.23 kg/m .      General Appearance:  Alert, afebrile, cooperative, no distress, appears stated age   HEENT:   Oropharynx is clear with mild erythema, clear to yellow nasal drainage present.  Maxillary sinus pressure on palpation.  Tympanic membranes and ear canals normal bilaterally.   Neck: Supple, symmetrical, no adenopathy.   Lungs:   Clear to auscultation bilaterally, respirations unlabored.  No expiratory wheeze or inspiratory crackles noted.   Heart:  Regular rate and rhythm, S1, S2 normal, no murmur, rub or gallop   Skin: Warm, dry. No rashes or lesions       This note has been dictated using voice recognition software. Any grammatical or context distortions are unintentional and inherent to the use of this software.     "

## 2021-05-27 NOTE — TELEPHONE ENCOUNTER
Refill Approved    Rx renewed per Medication Renewal Policy. Medication was last renewed on 1/22/19.    Last office visit 4/10/19    Maykel Lozano, [facility] Triage/Med Refill 4/18/2019     Requested Prescriptions   Pending Prescriptions Disp Refills     levothyroxine (SYNTHROID, LEVOTHROID) 100 MCG tablet [Pharmacy Med Name: LEVOTHYROXINE 0.100MG (100MCG) TAB] 90 tablet 0     Sig: TAKE 1 TABLET BY MOUTH DAILY AT 6 AM       Thyroid Hormones Protocol Failed - 4/18/2019  1:48 PM        Failed - TSH on file in past 12 months for patient age 12 & older     TSH   Date Value Ref Range Status   03/01/2018 4.66 0.30 - 5.00 uIU/mL Final                   Passed - Provider visit in past 12 months or next 3 months     Last office visit with prescriber/PCP: 9/19/2016 Taniya Blum MD OR same dept: 4/10/2019 Charity Soares CNP OR same specialty: 4/10/2019 Charity Soares CNP  Last physical: 3/1/2018 Last MTM visit: Visit date not found   Next visit within 3 mo: Visit date not found  Next physical within 3 mo: Visit date not found  Prescriber OR PCP: Taniya Blum MD  Last diagnosis associated with med order: 1. Hypothyroidism  - levothyroxine (SYNTHROID, LEVOTHROID) 100 MCG tablet [Pharmacy Med Name: LEVOTHYROXINE 0.100MG (100MCG) TAB]; TAKE 1 TABLET BY MOUTH DAILY AT 6 AM  Dispense: 90 tablet; Refill: 0    If protocol passes may refill for 12 months if within 3 months of last provider visit (or a total of 15 months).

## 2021-05-28 NOTE — TELEPHONE ENCOUNTER
RN cannot approve Refill Request    RN can NOT refill this medication Protocol failed and NO refill given. Last office visit: 9/19/2016 Taniya Blum MD Last Physical: 3/1/2018 Last MTM visit: Visit date not found Last visit same specialty: 4/10/2019 Charity Soares CNP.  Next visit within 3 mo: Visit date not found  Next physical within 3 mo: Visit date not found      Vannesa Villarreal, [facility] Triage/Med Refill 5/19/2019    Requested Prescriptions   Pending Prescriptions Disp Refills     lisinopril-hydrochlorothiazide (PRINZIDE,ZESTORETIC) 20-25 mg per tablet [Pharmacy Med Name: LISINOPRIL-HCTZ 20/25MG TABLETS] 90 tablet 0     Sig: TAKE 1 TABLET BY MOUTH DAILY       Diuretics/Combination Diuretics Refill Protocol  Failed - 5/17/2019  4:04 PM        Failed - Serum Potassium in past 12 months      No results found for: LN-POTASSIUM          Failed - Serum Sodium in past 12 months      No results found for: LN-SODIUM          Failed - Serum Creatinine in past 12 months      Creatinine   Date Value Ref Range Status   03/01/2018 1.04 0.60 - 1.10 mg/dL Final             Passed - Visit with PCP or prescribing provider visit in past 12 months     Last office visit with prescriber/PCP: 9/19/2016 Taniya Blum MD OR same dept: 4/10/2019 Charity Soares CNP OR same specialty: 4/10/2019 Cahrity Soares CNP  Last physical: 3/1/2018 Last MTM visit: Visit date not found   Next visit within 3 mo: Visit date not found  Next physical within 3 mo: Visit date not found  Prescriber OR PCP: Taniya Blum MD  Last diagnosis associated with med order: 1. Essential hypertension  - lisinopril-hydrochlorothiazide (PRINZIDE,ZESTORETIC) 20-25 mg per tablet [Pharmacy Med Name: LISINOPRIL-HCTZ 20/25MG TABLETS]; TAKE 1 TABLET BY MOUTH DAILY  Dispense: 90 tablet; Refill: 0    If protocol passes may refill for 12 months if within 3 months of last provider visit (or a total of 15 months).             Passed - Blood  pressure on file in past 12 months     BP Readings from Last 1 Encounters:   04/10/19 138/82

## 2021-05-29 NOTE — PROGRESS NOTES
Assessment/Plan:    1. Urinary tract infection  Will treat for urinary tract infection with Ceftinir 300 mg twice daily.  Given history of recurring infections, will treat for 10 days.  Will notify patient if antibiotic treatment needs to be adjusted based on culture results.  Discussed signs and symptoms of worsening infection to monitor for including worsening dysuria, frequency, urgency, abdominal pain or back pain or if she develops any systemic symptoms such as fevers, chills etc.  - Urinalysis-UC if Indicated  - Culture, Urine  - cefdinir (OMNICEF) 300 MG capsule; Take 1 capsule (300 mg total) by mouth 2 (two) times a day for 10 days.  Dispense: 20 capsule; Refill: 0      Subjective:    Erika Obrien is a 54 year old female seen today for evaluation of dysuria.  Symptoms started last night and have since worsened.  States that she is having discomfort when urinating as well as increased frequency and urgency.  Patient has a history of recurrent UTIs.  Typically gets several a year.  She was seen last here in clinic in October 2018.  Was prescribed Ceftin ear at that time and recalls that working well for her.  States that she was seen for UTI earlier this spring.  She denies any flank pain.  She does have some abdominal tenderness that is typical when she gets urinary tract infections.  She denies any fevers or chills.  Does get night sweats but attributes this to menopause.  She denies any vaginal symptoms of discharge or tenderness.  She does not have any additional concerns today.  Review of systems is as stated in HPI, and the remainder of the 10 system review is otherwise unremarkable.    Past Medical History, Family History, and Social History reviewed.    Past Surgical History:   Procedure Laterality Date     [place] LIGATE FALLOPIAN TUBE      Description: Tubal Ligation;  Recorded: 12/27/2007;  Annotations: 1995     [place] THYROIDECTOMY      Description: Near-Total Thyroidectomy;  Recorded: 09/17/2008;   Comments: due to Graves     TUBAL LIGATION       uterine ablation          Family History   Problem Relation Age of Onset     Cancer Mother         lung     Heart disease Mother      Cancer Father         lung        Past Medical History:   Diagnosis Date     Bladder infection      Hypertension      Hypothyroidism      Kidney stone         Social History     Tobacco Use     Smoking status: Never Smoker     Smokeless tobacco: Never Used   Substance Use Topics     Alcohol use: Yes     Alcohol/week: 2.0 oz     Types: 4 Standard drinks or equivalent per week     Drug use: Not on file        Current Outpatient Medications   Medication Sig Dispense Refill     ascorbic acid (VITAMIN C) 1000 MG tablet Take 1,000 mg by mouth 3 (three) times a day.       calcium carbonate (OS-KAMRON) 600 mg (1,500 mg) tablet Take 600 mg by mouth daily.       cholecalciferol, vitamin D3, (VITAMIN D3) 1,000 unit capsule Take 1,000 Units by mouth daily.       levothyroxine (SYNTHROID, LEVOTHROID) 100 MCG tablet TAKE 1 TABLET BY MOUTH DAILY AT 6 AM 90 tablet 0     lisinopril-hydrochlorothiazide (PRINZIDE,ZESTORETIC) 20-25 mg per tablet TAKE 1 TABLET BY MOUTH DAILY 90 tablet 0     multivitamin (ONE A DAY) per tablet Take 1 tablet by mouth daily.       OMEGA-3/DHA/EPA/FISH OIL (FISH OIL-OMEGA-3 FATTY ACIDS) 300-1,000 mg capsule Take 2 g by mouth daily.       cefdinir (OMNICEF) 300 MG capsule Take 1 capsule (300 mg total) by mouth 2 (two) times a day for 10 days. 20 capsule 0     ciprofloxacin HCl (CIPRO) 500 MG tablet Take 1 tablet (500 mg total) by mouth 2 (two) times a day. 20 tablet 2     No current facility-administered medications for this visit.           Objective:    Vitals:    06/18/19 1451   BP: 138/82   Patient Site: Left Arm   Patient Position: Sitting   Cuff Size: Adult Regular   Pulse: 80   Temp: 98.1  F (36.7  C)   Weight: 201 lb 12.8 oz (91.5 kg)      Body mass index is 31.61 kg/m .      General Appearance:  Alert, afebrile,  cooperative, no distress, appears stated age   Lungs:   Clear to auscultation bilaterally, respirations unlabored.   Heart:  Regular rate and rhythm, S1, S2 normal.   Abdomen:   Soft, non-tender, positive bowel sounds, no masses, no organomegaly   Back:  No CVA tenderness.   Skin: Warm, dry.  No rashes or lesions     Lab Results   Component Value Date    COLORU Yellow 06/18/2019    CLARITYU Slightly Cloudy (!) 06/18/2019    GLUCOSEU Negative 06/18/2019    BILIRUBINUR Negative 06/18/2019    KETONESU Negative 06/18/2019    SPECGRAV 1.015 06/18/2019    HGBUA Small (!) 06/18/2019    PHUR 7.0 06/18/2019    PROTEINUA 30 mg/dL (!) 06/18/2019    UROBILINOGEN 0.2 E.U./dL 06/18/2019    NITRITE Negative 06/18/2019    LEUKOCYTESUR Large (!) 06/18/2019    BACTERIA Few (!) 06/18/2019    RBCUA 0-2 06/18/2019    WBCUA 10-25 (!) 06/18/2019    SQUAMEPI 0-5 06/18/2019       This note has been dictated using voice recognition software. Any grammatical or context distortions are unintentional and inherent to the use of this software.

## 2021-05-30 ENCOUNTER — RECORDS - HEALTHEAST (OUTPATIENT)
Dept: ADMINISTRATIVE | Facility: CLINIC | Age: 56
End: 2021-05-30

## 2021-05-30 NOTE — PATIENT INSTRUCTIONS - HE
You were seen today for colitis. This is inflammation of the colon. We do not know the cause for this inflammation at this time, but we will treat you for suspected infectious cause due to bacteria.    Symptom management:  - Take antibiotics as directed  - Recommend daily probiotics   - May use acetaminophen for pain or discomfort  - Drink plenty of clear fluids    Reasons to return for follow-up at the emergency room:  - A fainting spell  - Increased blood in the stools  - Increased abdominal pains  - Severe vomiting, unable to tolerate fluids for 24 hours  - Chest pains or shortness of breath    Otherwise follow-up with gastroenterology in 3-4 days for further evaluation

## 2021-05-30 NOTE — TELEPHONE ENCOUNTER
"Went to walk in clinic Friday for stomach cramping.    Went had Bm was diarrhea ( complete body emptied) and blood.  Put her on 2 antibiotics. Ended up pooping water and blood.    Told her to be seen within 72 hours at gastro.  Has appt tomorrow.  CT was done.    Has not had BM since then.  Also concerned about being on 2 antibiotics. Pain has moved from lower abd. To upper abd.  Feels distended.    Now if blood comes out, looks like old blood, small clots.  Very tender up top.    Now \"uncomfortable\" and not painful in abdomen.    Recommended miralax today.  Has not been eating much.  Eat light meals.  Continue antibiotics and keep hydrated, managable pain till seen tomorrow at [facility], or if pain increasing go to ED.  Triage here 24/7.    Caller agrees with care advice given. Agreed to call back if patient has additional symptoms or questions.    Melanie Sparrow, RN, [facility] Nurse Triage/Med Refills RN     "

## 2021-05-30 NOTE — TELEPHONE ENCOUNTER
Reason for Disposition    Treating constipation with Over-The-Counter (OTC) medicines, questions about    Protocols used: CONSTIPATION-A-AH

## 2021-05-30 NOTE — TELEPHONE ENCOUNTER
Refill Approved    Rx renewed per Medication Renewal Policy. Medication was last renewed on 4/18/19 .    Myranda Kennedy, [facility] Triage/Med Refill 7/14/2019     Requested Prescriptions   Pending Prescriptions Disp Refills     levothyroxine (SYNTHROID, LEVOTHROID) 100 MCG tablet [Pharmacy Med Name: LEVOTHYROXINE 0.100MG (100MCG) TAB] 90 tablet 0     Sig: TAKE 1 TABLET BY MOUTH DAILY AT 6 AM       Thyroid Hormones Protocol Passed - 7/14/2019  1:30 PM        Passed - Provider visit in past 12 months or next 3 months     Last office visit with prescriber/PCP: 7/8/2019 Taniya Blum MD OR same dept: 7/8/2019 Taniya Blum MD OR same specialty: 7/8/2019 Taniya Blum MD  Last physical: 3/1/2018 Last MTM visit: Visit date not found   Next visit within 3 mo: Visit date not found  Next physical within 3 mo: Visit date not found  Prescriber OR PCP: Taniya Blum MD  Last diagnosis associated with med order: 1. Hypothyroidism  - levothyroxine (SYNTHROID, LEVOTHROID) 100 MCG tablet [Pharmacy Med Name: LEVOTHYROXINE 0.100MG (100MCG) TAB]; TAKE 1 TABLET BY MOUTH DAILY AT 6 AM  Dispense: 90 tablet; Refill: 0    If protocol passes may refill for 12 months if within 3 months of last provider visit (or a total of 15 months).             Passed - TSH on file in past 12 months for patient age 12 & older     TSH   Date Value Ref Range Status   07/08/2019 2.79 0.30 - 5.00 uIU/mL Final

## 2021-05-30 NOTE — TELEPHONE ENCOUNTER
"Pt is calling in about explosive diarrhea that started at noon today, causing her to have abdominal pain, diaphoresis, and she felt like passing out. Pain is better now, \"4\", but reports it goes up to \"8\" when she has a BM.  Pt has had 2 stools today, and also reports blood in her stool. Pt denies fever, or vomiting.   Per protocol pt should be seen in the office today. Pt was transferred to scheduling, and there are no appointments today. Advised pt to go to the [facility]. Pt agrees with plan, and will go to the [facility]. Advised pt to call back if she has further needs.    Dov Crockett, RN [facility] Triage/Medication Refill    Reason for Disposition    Blood in the stool    Protocols used: DIARRHEA-A-OH      "

## 2021-05-30 NOTE — PROGRESS NOTES
Assessment/Plan:     1. Hypertension  Adequately controlled on combination of lisinopril and hydrochlorthiazide which she will continue.  Encourage healthy lifestyle habits.  Will check renal profile in follow-up of this medication.    2. Chronic kidney disease, stage III (moderate) (H)  This remains asymptomatic.  Will obtain labs as noted.  Avoid nephrotoxic medications.  - Vitamin D, Total (25-Hydroxy)  - Uric Acid  - Glycosylated Hemoglobin A1c  - Parathyroid Hormone Intact; Future  - Renal Function Profile  - HM2(CBC w/o Differential)    3. Acquired hypothyroidism  Clinically euthyroid.  Continue levothyroxine.  Will check thyroid cascade today.  - Thyroid Cascade    4. Recurrent UTI  Only a symptom medic.  Order entered for UA as needed and ciprofloxacin course as needed at onset of symptoms.  - Urinalysis-UC if Indicated; Standing  - ciprofloxacin HCl (CIPRO) 500 MG tablet; Take 1 tablet (500 mg total) by mouth 2 (two) times a day.  Dispense: 20 tablet; Refill: 2    Tetanus booster administered today.  Mammogram obtained through [facility] April 2019.    There are no Patient Instructions on file for this visit.     Return in about 1 year (around 7/8/2020) for Annual Wellness Visit.      Subjective:      Erika Obrien is a 54 y.o. female presenting to clinic today for follow-up of her routine health conditions.  Remains on lisinopril hydrochlorthiazide management of hypertension and tolerating well.  Home blood pressures in the 1 10-1 20s over 70s to 80s.  Will have occasional leg cramps overnight but not bothersome. Denies lightheadedness, dizziness, headaches, chest pain, dyspnea, or swelling.  Feeling as though her thyroid is in good balance.  Noting some hot flashes developing over the last year, over-the-counter Estroven has been helpful.  Exercise by walking and playing tennis.  Sleep stable.  History of recurrent UTI, has prescription for ciprofloxacin on hand.  Previously we have had a standing order for  urinalysis as needed, she is able to give us a urine sample prior to initiating antibiotics which is been very successful for her in the past and we would like to continue this.  She agreeable to tetanus booster today.    Current Outpatient Medications   Medication Sig Dispense Refill     ascorbic acid (VITAMIN C) 1000 MG tablet Take 1,000 mg by mouth 3 (three) times a day.       calcium carbonate (OS-KAMRON) 600 mg (1,500 mg) tablet Take 600 mg by mouth daily.       cholecalciferol, vitamin D3, (VITAMIN D3) 1,000 unit capsule Take 1,000 Units by mouth daily.       levothyroxine (SYNTHROID, LEVOTHROID) 100 MCG tablet TAKE 1 TABLET BY MOUTH DAILY AT 6 AM 90 tablet 0     lisinopril-hydrochlorothiazide (PRINZIDE,ZESTORETIC) 20-25 mg per tablet TAKE 1 TABLET BY MOUTH DAILY 90 tablet 0     multivitamin (ONE A DAY) per tablet Take 1 tablet by mouth daily.       OMEGA-3/DHA/EPA/FISH OIL (FISH OIL-OMEGA-3 FATTY ACIDS) 300-1,000 mg capsule Take 2 g by mouth daily.       UNABLE TO FIND Med Name:Shauna Menopause relief       ciprofloxacin HCl (CIPRO) 500 MG tablet Take 1 tablet (500 mg total) by mouth 2 (two) times a day. 20 tablet 2     No current facility-administered medications for this visit.        Past Medical History, Family History, and Social History reviewed.  Past Medical History:   Diagnosis Date     Bladder infection      Hypertension      Hypothyroidism      Kidney stone      Past Surgical History:   Procedure Laterality Date     [place] LIGATE FALLOPIAN TUBE      Description: Tubal Ligation;  Recorded: 12/27/2007;  Annotations: 1995     OR THYROIDECTOMY      Description: Near-Total Thyroidectomy;  Recorded: 09/17/2008;  Comments: due to Graves     TUBAL LIGATION       uterine ablation       No known drug allergies  Family History   Problem Relation Age of Onset     Cancer Mother         lung     Heart disease Mother      Cancer Father         lung     Social History     Socioeconomic History     Marital status:  "     Spouse name: Not on file     Number of children: Not on file     Years of education: Not on file     Highest education level: Not on file   Occupational History     Not on file   Social Needs     Financial resource strain: Not on file     Food insecurity:     Worry: Not on file     Inability: Not on file     Transportation needs:     Medical: Not on file     Non-medical: Not on file   Tobacco Use     Smoking status: Never Smoker     Smokeless tobacco: Never Used   Substance and Sexual Activity     Alcohol use: Yes     Alcohol/week: 2.0 oz     Types: 4 Standard drinks or equivalent per week     Drug use: Not on file     Sexual activity: Not on file   Lifestyle     Physical activity:     Days per week: Not on file     Minutes per session: Not on file     Stress: Not on file   Relationships     Social connections:     Talks on phone: Not on file     Gets together: Not on file     Attends Spiritism service: Not on file     Active member of club or organization: Not on file     Attends meetings of clubs or organizations: Not on file     Relationship status: Not on file     Intimate partner violence:     Fear of current or ex partner: Not on file     Emotionally abused: Not on file     Physically abused: Not on file     Forced sexual activity: Not on file   Other Topics Concern     Not on file   Social History Narrative     Not on file         Review of systems is as stated in HPI, and the remainder of the 10 system review is otherwise negative.    Objective:     Vitals:    07/08/19 1326   BP: 118/74   Pulse: 81   Resp: 20   Temp: 98.6  F (37  C)   TempSrc: Oral   SpO2: 98%   Weight: 204 lb (92.5 kg)   Height: 5' 7\" (1.702 m)    Body mass index is 31.95 kg/m .    Alert female.  Mucous memories moist.  Heart with regular rate and rhythm.  Lungs clear.  No edema.  Good capillary refill.      This note has been dictated using voice recognition software. Any grammatical or context distortions are unintentional and " inherent to the the software.

## 2021-05-30 NOTE — PROGRESS NOTES
Assessment:       1. Bloody stools  HM1(CBC and Differential)    HM1 (CBC with Diff)    Occult Blood(ICT)    CANCELED: Occult Blood, Fecal   2. Colitis  CT Abdomen Pelvis Without Oral With IV Contrast    ciprofloxacin HCl (CIPRO) 500 MG tablet    metroNIDAZOLE (FLAGYL) 500 MG tablet    Ambulatory referral to Gastroenterology   3. Pre-syncope         Medical Decision Making  Patient presents after a presyncopal episode and bright red blood per rectum with colitis seen on abdominal CT. Initial concern for gastric ulcer versus hemorrhoids versus diverticulitis versus colon cancer. Pre-syncopal episode appears due to the abdominal pain and bleeding. Patient denies chest pains and shortness of breath. Occult fecal test was positive, but no nery blood on rectal exam. CBC showed normal WBC count and patient denies fevers to make infection unlikely. Normal hemoglobin to rule out anemia and chronic bleed. She has no risk factors for gastric ulcers including NSAID use, alcohol, and smoking. No hemorrhoids seen or palpated on rectal exam, and patient has no history of constipation. Still starting patient on empiric antibiotics for colitis given unknown cause. Referral to gastroenterology placed. Patient is hemodynamically stable at time of visit.       Plan:       Antibiotics per orders.   Probiotics.  OTC analgesics discussed.  Fluids.  Discussed signs of worsening symptoms and when to follow-up with PCP if no symptom improvement.  Referral to gastroenterology.      Patient Instructions   You were seen today for colitis. This is inflammation of the colon. We do not know the cause for this inflammation at this time, but we will treat you for suspected infectious cause due to bacteria.    Symptom management:  - Take antibiotics as directed  - Recommend daily probiotics   - May use acetaminophen for pain or discomfort  - Drink plenty of clear fluids    Reasons to return for follow-up at the emergency room:  - A fainting  spell  - Increased blood in the stools  - Increased abdominal pains  - Severe vomiting, unable to tolerate fluids for 24 hours  - Chest pains or shortness of breath    Otherwise follow-up with gastroenterology in 3-4 days for further evaluation     Subjective:       Erika Obrien is a 54 y.o. female here for evaluation of bright red blood per rectum. Onset was 5 hours ago. Patient was in the kitchen cooking when she developed sudden blurry vision, lightheadedness, and abdominal pains. She denies loss of conscioussness, but then noticed abdominal cramping and cold sweats. Patient had a large, loose stool with drops of blood on top. Then her next bowel movement was bright red blood with stringy appearance. Patient has continued to have waves of lower abdominal pains. Currently not having any pains. Patient denies NSAID use and limited alcohol use just at social gatherings. She takes ciprofloxacin as needed for UTI's. Last UTI was treated over 1 month ago. Also denies fevers, emesis, sore throat, chest pains, and shortness of breath. No history of smoking. Colonscopy was done 1 year ago with no abnormal findings.    The following portions of the patient's history were reviewed and updated as appropriate: allergies, current medications and problem list.    Review of Systems  Pertinent items are noted in HPI.     Allergies  Allergies   Allergen Reactions     No Known Drug Allergies      Family History   Family History   Problem Relation Age of Onset     Cancer Mother         lung     Heart disease Mother      Cancer Father         lung       Social History  Social History     Socioeconomic History     Marital status:      Spouse name: None     Number of children: None     Years of education: None     Highest education level: None   Occupational History     None   Social Needs     Financial resource strain: None     Food insecurity:     Worry: None     Inability: None     Transportation needs:     Medical: None      Non-medical: None   Tobacco Use     Smoking status: Never Smoker     Smokeless tobacco: Never Used   Substance and Sexual Activity     Alcohol use: Yes     Alcohol/week: 2.0 oz     Types: 4 Standard drinks or equivalent per week     Drug use: None     Sexual activity: None   Lifestyle     Physical activity:     Days per week: None     Minutes per session: None     Stress: None   Relationships     Social connections:     Talks on phone: None     Gets together: None     Attends Ten[employer] service: None     Active member of club or organization: None     Attends meetings of clubs or organizations: None     Relationship status: None     Intimate partner violence:     Fear of current or ex partner: None     Emotionally abused: None     Physically abused: None     Forced sexual activity: None   Other Topics Concern     None   Social History Narrative     None         Objective:       /77 (Patient Site: Right Arm, Patient Position: Sitting, Cuff Size: Adult Regular)   Pulse 89   Temp 97.8  F (36.6  C) (Oral)   Resp 16   Wt 202 lb (91.6 kg)   SpO2 99%   BMI 31.64 kg/m    General appearance: alert, appears stated age, cooperative, no distress and non-toxic  Head: Normocephalic, without obvious abnormality, atraumatic  Throat: lips, mucosa, and tongue normal; teeth and gums normal  Neck: no adenopathy and supple, symmetrical, trachea midline  Back: no CVA tenderness  Lungs: clear to auscultation bilaterally and no rhonchi, rales, or wheezing   Heart: regular rate and rhythm, S1, S2 normal, no murmur, click, rub or gallop  Abdomen: LLQ abdominal tenderness, no masses, no organomegaly, abdomen soft, normal bowel sounds  Skin: Skin color, texture, turgor normal. No rashes or lesions  Rectal: normal rectal tone, no masses, no tenderness, no visible blood, skin intact     Lab Results    Recent Results (from the past 24 hour(s))   HM1 (CBC with Diff)   Result Value Ref Range    WBC 9.9 4.0 - 11.0 thou/uL    RBC 4.71 3.80  - 5.40 mill/uL    Hemoglobin 14.0 12.0 - 16.0 g/dL    Hematocrit 41.8 35.0 - 47.0 %    MCV 89 80 - 100 fL    MCH 29.7 27.0 - 34.0 pg    MCHC 33.5 32.0 - 36.0 g/dL    RDW 12.0 11.0 - 14.5 %    Platelets 219 140 - 440 thou/uL    MPV 7.7 7.0 - 10.0 fL    Neutrophils % 79 (H) 50 - 70 %    Lymphocytes % 14 (L) 20 - 40 %    Monocytes % 5 2 - 10 %    Eosinophils % 1 0 - 6 %    Basophils % 1 0 - 2 %    Neutrophils Absolute 7.8 (H) 2.0 - 7.7 thou/uL    Lymphocytes Absolute 1.4 0.8 - 4.4 thou/uL    Monocytes Absolute 0.5 0.0 - 0.9 thou/uL    Eosinophils Absolute 0.1 0.0 - 0.4 thou/uL    Basophils Absolute 0.1 0.0 - 0.2 thou/uL   Occult Blood(ICT)   Result Value Ref Range    Fecal Occult Bld (ICT) 1 Positive (!) Negative     I personally reviewed these results and discussed findings with the patient.    Imaging    Ct Abdomen Pelvis Without Oral With Iv Contrast    Result Date: 7/19/2019  EXAM DATE:         07/19/2019 EXAM: CT ABDOMEN, PELVIS WITH CONTRAST LOCATION: [facility] DATE/TIME: 7/19/2019 7:00 PM INDICATION: LLQ abdominal pain with bright red blood per rectum; rule out diverticulitis. COMPARISON: None. TECHNIQUE: Helical enhanced thin-section CT scan of the abdomen and pelvis was performed following injection of IV contrast. Multiplanar reformats were obtained. Dose reduction techniques were used. CONTRAST: 75 mL Isovue 370 was administered VIA IV from a single use vial with 25 mL discarded. SPR ISTAT CR= 1.1 mg/dL, eGFR= 52. FINDINGS: LUNG BASES: Negative. ABDOMEN: Hepatic steatosis. Gallbladder negative. Stomach and duodenal sweep negative. Pancreas normal. Spleen normal. Adrenal glands normal. Right kidney: No renal calculi. Hypodensity lower pole 0.8 cm. No ureteric calculi. Left kidney: No renal calculi, hydronephrosis or enhancing masses. No ureteric calculi. No para-aortic adenopathy. The small bowel has a normal appearance. No bowel obstruction. The colon has a featureless appearance and there  is submucosal fat deposition demonstrated in the ascending, transverse and descending colon suggests colitis. PELVIS: Urine-distended bladder. MUSCULOSKELETAL: Negative. CONCLUSION: 1.  The colon has a featureless appearance with submucosal fat deposition and findings suggestive of colitis. Direct inspection suggested. 2.  Hepatic steatosis. DICOM format image data is available to non-affiliated external healthcare facilities or entities on a secure, media-free, reciprocally searchable basis with patient authorization for at least a 12-month period after the study.     I personally reviewed the results, which showed colitis. Discussed findings with the patient.

## 2021-05-31 VITALS — BODY MASS INDEX: 32.28 KG/M2 | WEIGHT: 200 LBS

## 2021-05-31 NOTE — TELEPHONE ENCOUNTER
Refill Approved    Rx renewed per Medication Renewal Policy. Medication was last renewed on 5/20/2019 for 90/0.  Last OV 7/8/2019  Nathalia Armando, [facility] Triage/Med Refill 8/16/2019     Requested Prescriptions   Pending Prescriptions Disp Refills     lisinopril-hydrochlorothiazide (PRINZIDE,ZESTORETIC) 20-25 mg per tablet [Pharmacy Med Name: LISINOPRIL-HCTZ 20/25MG TABLETS] 90 tablet 0     Sig: TAKE 1 TABLET BY MOUTH DAILY       Diuretics/Combination Diuretics Refill Protocol  Passed - 8/16/2019 12:04 PM        Passed - Visit with PCP or prescribing provider visit in past 12 months     Last office visit with prescriber/PCP: 7/8/2019 Taniya Blum MD OR same dept: 7/8/2019 Taniya Blum MD OR same specialty: 7/8/2019 Taniya Blum MD  Last physical: 3/1/2018 Last MTM visit: Visit date not found   Next visit within 3 mo: Visit date not found  Next physical within 3 mo: Visit date not found  Prescriber OR PCP: Taniya Blum MD  Last diagnosis associated with med order: 1. Essential hypertension  - lisinopril-hydrochlorothiazide (PRINZIDE,ZESTORETIC) 20-25 mg per tablet [Pharmacy Med Name: LISINOPRIL-HCTZ 20/25MG TABLETS]; TAKE 1 TABLET BY MOUTH DAILY  Dispense: 90 tablet; Refill: 0    If protocol passes may refill for 12 months if within 3 months of last provider visit (or a total of 15 months).             Passed - Serum Potassium in past 12 months      Lab Results   Component Value Date    Potassium 4.1 07/08/2019             Passed - Serum Sodium in past 12 months      Lab Results   Component Value Date    Sodium 143 07/08/2019             Passed - Blood pressure on file in past 12 months     BP Readings from Last 1 Encounters:   07/19/19 127/77             Passed - Serum Creatinine in past 12 months      Creatinine   Date Value Ref Range Status   07/08/2019 1.01 0.60 - 1.10 mg/dL Final

## 2021-06-01 ENCOUNTER — RECORDS - HEALTHEAST (OUTPATIENT)
Dept: ADMINISTRATIVE | Facility: CLINIC | Age: 56
End: 2021-06-01

## 2021-06-01 VITALS — WEIGHT: 186 LBS | BODY MASS INDEX: 30.25 KG/M2

## 2021-06-01 VITALS — HEIGHT: 66 IN | BODY MASS INDEX: 33.27 KG/M2 | WEIGHT: 207 LBS

## 2021-06-02 ENCOUNTER — RECORDS - HEALTHEAST (OUTPATIENT)
Dept: ADMINISTRATIVE | Facility: CLINIC | Age: 56
End: 2021-06-02

## 2021-06-02 VITALS — HEIGHT: 67 IN | BODY MASS INDEX: 31.3 KG/M2 | WEIGHT: 199.4 LBS

## 2021-06-03 VITALS — WEIGHT: 204 LBS | HEIGHT: 67 IN | BODY MASS INDEX: 32.02 KG/M2

## 2021-06-03 VITALS — BODY MASS INDEX: 31.64 KG/M2 | WEIGHT: 202 LBS

## 2021-06-03 VITALS — BODY MASS INDEX: 31.61 KG/M2 | WEIGHT: 201.8 LBS

## 2021-06-03 NOTE — TELEPHONE ENCOUNTER
"Pt reports \"sore back for two weeks.\"  Not apparently musculo-skeletal.  Now wonders if kidney-related.    Pain is located in R flank.  No urinary symptoms whatsoever.  No fevers.  No abdominal pain.  No nausea.    R flank pain has gradually worsened.  Last night prevented sleep.  Pt rates current pain \"like a dull throb, 'maybe 2/10', always just know it's there.\"  \"Have to move in certain ways.\"    Pt agrees to clinical eval today per triage disposition.  Warm transferred to a  for this purpose now.    Lisa Mcdaniel RN  [facility] Triage     Reason for Disposition    MODERATE pain (e.g., interferes with normal activities or awakens from sleep)    Protocols used: FLANK PAIN-A-OH      "

## 2021-06-03 NOTE — PROGRESS NOTES
Assessment/Plan:    1. Flank pain  Differentials include muscle strain, pleurisy, nephrolithiasis, UTI.  We obtained a urinalysis today which is normal.  I discussed with patient that this is reassuring and low likelihood for an acute renal etiology.  Patient appears very well overall, not having any discomfort currently.  Exam is overall very unremarkable.  We discussed treating conservatively for muscle strain with gentle stretching, rest and monitoring for any new or worsening symptoms versus obtaining labs to rule out atypical gallstone presentation, underlying infection etc.  Patient wishes to proceed with lab work.  Will check lipase, CBC and metabolic panel today and follow-up with patient regarding these results when available.  If these labs are normal, recommend supportive cares for 2 weeks.  If symptoms persist or worsen in any way beyond that point, will consider further work-up with imaging etc.  - Urinalysis-UC if Indicated  - Lipase  - HM1(CBC and Differential)  - Comprehensive Metabolic Panel  - HM1 (CBC with Diff)    2.  Hypertension  Blood pressures well controlled on lisinopril-hydrochlorthiazide 20-25 mg daily.    3.  CKD stage III  History of chronic kidney disease, will check metabolic panel today to ensure stable renal function.  -Conference of metabolic panel    Subjective:    Erika Obrien is a 54 year old female seen today for evaluation of right-sided flank pain.  Past medical history includes hypertension, hypothyroidism and CKD stage III.  Patient developed symptoms of low right back pain 2 weeks ago.  Initially thought she tweaked her back and strained a muscle.  She has been stretching and has not noticed relief in symptoms yet.  The discomfort in her right low back is causing her to wake up at night on occasion.  It is fairly consistent.  Seems to improve with rest and worsened with certain activities such as twisting motion or bending at the waist.  She recalls having symptoms of a  cold and cough a few weeks ago.  Feels that she may have pulled a muscle from all of the coughing.  The cough is since resolved entirely.  She denies any fevers, chills or other systemic symptoms.  She does have occasional hot flashes but feels that this is likely related to menopause.  She has a hard time knowing however because she had an ablation and D&C roughly 6 years ago and has not had menstrual bleeding since then.  Denies any notable back injuries, surgeries etc.  She does not have any symptoms of paresthesias in the lower extremities.  Review of systems is as stated in HPI, and the remainder of the 10 system review is otherwise unremarkable.    Past Medical History, Family History, and Social History reviewed.    Past Surgical History:   Procedure Laterality Date     [place] LIGATE FALLOPIAN TUBE      Description: Tubal Ligation;  Recorded: 12/27/2007;  Annotations: 1995     [facility] THYROIDECTOMY      Description: Near-Total Thyroidectomy;  Recorded: 09/17/2008;  Comments: due to Graves     TUBAL LIGATION       uterine ablation          Family History   Problem Relation Age of Onset     Cancer Mother         lung     Heart disease Mother      Cancer Father         lung        Past Medical History:   Diagnosis Date     Bladder infection      Hypertension      Hypothyroidism      Kidney stone         Social History     Tobacco Use     Smoking status: Never Smoker     Smokeless tobacco: Never Used   Substance Use Topics     Alcohol use: Yes     Alcohol/week: 3.3 standard drinks     Types: 4 Standard drinks or equivalent per week     Drug use: Not on file        Current Outpatient Medications   Medication Sig Dispense Refill     ascorbic acid (VITAMIN C) 1000 MG tablet Take 1,000 mg by mouth 3 (three) times a day.       calcium carbonate (OS-KAMRON) 600 mg (1,500 mg) tablet Take 600 mg by mouth daily.       cholecalciferol, vitamin D3, (VITAMIN D3) 1,000 unit capsule Take 1,000 Units by mouth daily.       ciprofloxacin HCl  (CIPRO) 500 MG tablet Take 1 tablet (500 mg total) by mouth 2 (two) times a day. 20 tablet 2     levothyroxine (SYNTHROID, LEVOTHROID) 100 MCG tablet TAKE 1 TABLET BY MOUTH DAILY AT 6 AM 90 tablet 3     lisinopril-hydrochlorothiazide (PRINZIDE,ZESTORETIC) 20-25 mg per tablet Take 1 tablet by mouth daily. 90 tablet 3     multivitamin (ONE A DAY) per tablet Take 1 tablet by mouth daily.       OMEGA-3/DHA/EPA/FISH OIL (FISH OIL-OMEGA-3 FATTY ACIDS) 300-1,000 mg capsule Take 2 g by mouth daily.       UNABLE TO FIND Med Name:[facility] Menopause relief       No current facility-administered medications for this visit.           Objective:    Vitals:    11/29/19 1346   BP: 126/86   Patient Site: Right Arm   Patient Position: Sitting   Cuff Size: Adult Large   Pulse: 93   SpO2: 97%   Weight: 206 lb 9.6 oz (93.7 kg)      Body mass index is 32.36 kg/m .      General Appearance:  Alert, cooperative, no distress, appears stated age   Neck: Supple, symmetrical, no adenopathy.   Lungs:   Clear to auscultation bilaterally, respirations unlabored.  No expiratory wheeze or inspiratory crackles noted.   Heart:    Back:  Regular rate and rhythm, S1, S2 normal, no murmur, rub or gallop  Symmetric, no curvature.  Range of motion intact.  No CVA tenderness.   Abdomen:   Soft, non-tender, positive bowel sounds, no masses, no organomegaly   Extremities: Extremities normal.  No cyanosis or edema   Skin: Warm, dry.  Skin color, texture, turgor normal, no rashes or lesions   Neurologic:  Grossly intact.         This note has been dictated using voice recognition software. Any grammatical or context distortions are unintentional and inherent to the use of this software.

## 2021-06-04 VITALS
BODY MASS INDEX: 32.36 KG/M2 | WEIGHT: 206.6 LBS | HEART RATE: 93 BPM | DIASTOLIC BLOOD PRESSURE: 86 MMHG | OXYGEN SATURATION: 97 % | SYSTOLIC BLOOD PRESSURE: 126 MMHG

## 2021-06-04 VITALS
DIASTOLIC BLOOD PRESSURE: 80 MMHG | HEART RATE: 87 BPM | SYSTOLIC BLOOD PRESSURE: 120 MMHG | OXYGEN SATURATION: 99 % | WEIGHT: 211.1 LBS | BODY MASS INDEX: 33.06 KG/M2

## 2021-06-06 NOTE — PROGRESS NOTES
Assessment/Plan:    1. Cervical radiculopathy  Cervical spine xray completed today. This is without any obvious abnormalities, will have radiology confirm this. We discussed the possibility of a MRI versus PT for left sided radiculopathy. Will start conservatively with PT and if symptoms persist or worsen in any way, will refer for MRI or further evaluation by spine care.  - XR Cervical Spine 2 - 3 VWS; Future  - Ambulatory referral to PT/OT    2. Back muscle spasm  Back spasms for one week. Not convinced that this is contributing to the radiculopathy but could be possible. Will provide cyclobenzaprine. Discussed potential side effects of the medication to monitor for. I recommend continuing with NSAIDS, heat, massage ans gentle stretching as well. Follow up if symptoms persist.  - cyclobenzaprine (FLEXERIL) 5 MG tablet; Take 1 tablet (5 mg total) by mouth 3 (three) times a day as needed for muscle spasms.  Dispense: 30 tablet; Refill: 0    3. Recurrent UTI  Reviewed history of recurrent UTIs and pyelonephirits. She is asymptomatic currently but requesting refill prior to travel in the event hat she develops infection.   - ciprofloxacin HCl (CIPRO) 500 MG tablet; Take 1 tablet (500 mg total) by mouth 2 (two) times a day.  Dispense: 20 tablet; Refill: 2    4. Visit for screening mammogram  - Mammo Screening Bilateral; Future    Subjective:    Erika Obrien is a 55 year old female seen today for evaluation of upper back spasms and occasional sensation of numbness and tingling down her left arm.  Patient states that her back spasms started about a week ago.  She describes her upper back feeling tight and stiff as well as having a lot of knots in it.  She finds that it is worse with immobility.  Massage and stretching helps.  She has had symptoms of left arm numbness and tingling for several months, even maybe 8 years.  She denies any injury to her cervical spine, shoulder or arm previously.  Pain is manageable at this  point.  She does feel that it is more noticeable since her upper back and neck spasms.  She has been taking Advil twice daily as needed.  She is not needing to take this as much currently.  She finds that changing positions and movement helps with her arm symptoms.  She does not feel that her arm is weak necessarily but does have a harder time with full range of motion, particularly when trying to reach her arm behind her.  She is not having symptoms in the right arm.  No headaches or other neurologic symptoms.    Patient has history of recurrent UTIs.  States that she is asymptomatic currently but is requesting to have prescription for antibiotic sent to her pharmacy in case of infection while she is on medication.  She would like medication available to her in the event that UTI symptoms return.  Review of systems is as stated in HPI, and the remainder of the 10 system review is otherwise unremarkable.    Past Medical History, Family History, and Social History reviewed.    Past Surgical History:   Procedure Laterality Date     [place] LIGATE FALLOPIAN TUBE      Description: Tubal Ligation;  Recorded: 12/27/2007;  Annotations: 1995     [place] THYROIDECTOMY      Description: Near-Total Thyroidectomy;  Recorded: 09/17/2008;  Comments: due to Graves     TUBAL LIGATION       uterine ablation          Family History   Problem Relation Age of Onset     Cancer Mother         lung     Heart disease Mother      Cancer Father         lung        Past Medical History:   Diagnosis Date     Bladder infection      Hypertension      Hypothyroidism      Kidney stone         Social History     Tobacco Use     Smoking status: Never Smoker     Smokeless tobacco: Never Used   Substance Use Topics     Alcohol use: Yes     Alcohol/week: 3.3 standard drinks     Types: 4 Standard drinks or equivalent per week     Drug use: Not on file        Current Outpatient Medications   Medication Sig Dispense Refill     ascorbic acid (VITAMIN C) 1000 MG  tablet Take 1,000 mg by mouth 3 (three) times a day.       calcium carbonate (OS-KAMRON) 600 mg (1,500 mg) tablet Take 600 mg by mouth daily.       cholecalciferol, vitamin D3, (VITAMIN D3) 1,000 unit capsule Take 1,000 Units by mouth daily.       levothyroxine (SYNTHROID, LEVOTHROID) 100 MCG tablet TAKE 1 TABLET BY MOUTH DAILY AT 6 AM 90 tablet 3     lisinopril-hydrochlorothiazide (PRINZIDE,ZESTORETIC) 20-25 mg per tablet Take 1 tablet by mouth daily. 90 tablet 3     multivitamin (ONE A DAY) per tablet Take 1 tablet by mouth daily.       OMEGA-3/DHA/EPA/FISH OIL (FISH OIL-OMEGA-3 FATTY ACIDS) 300-1,000 mg capsule Take 2 g by mouth daily.       UNABLE TO FIND Med Name:[facility] Menopause relief       ciprofloxacin HCl (CIPRO) 500 MG tablet Take 1 tablet (500 mg total) by mouth 2 (two) times a day. 20 tablet 2     cyclobenzaprine (FLEXERIL) 5 MG tablet Take 1 tablet (5 mg total) by mouth 3 (three) times a day as needed for muscle spasms. 30 tablet 0     No current facility-administered medications for this visit.           Objective:    Vitals:    03/11/20 1559   BP: 120/80   Patient Site: Right Arm   Patient Position: Sitting   Cuff Size: Adult Regular   Pulse: 87   SpO2: 99%   Weight: 211 lb 1.6 oz (95.8 kg)      Body mass index is 33.06 kg/m .      General Appearance:  Alert, cooperative, no distress, appears stated age   Heart:  Regular rate and rhythm, S1, S2 normal, no murmur, rub or gallop   Back:    Symmetric.  No point tenderness noted along cervical spine.  Range of motion is intact.   Extremities:  Patient's left arm with normal sensation and strength.  She does have somewhat decreased range of motion when reaching behind her back.  All other extremities normal.  No cyanosis or edema   Neurologic:  Grossly intact.  Equal strength and sensation noted throughout.           This note has been dictated using voice recognition software. Any grammatical or context distortions are unintentional and inherent to the  use of this software.

## 2021-06-07 NOTE — TELEPHONE ENCOUNTER
Upcoming Appointment Question  When is the appointment: 4/14/2020  What is your appointment for?: back spasms and arm mobility  Who is your appointment scheduled with?: PCP only  What is your question/concern?: Patient states at this time the back spasms have resolved.  She has not yet started OT for arm mobility but will reschedule soon.  Please cancel appt.  Writer did cancel appt.  RENEE  Okay to leave a detailed message?: No call back needed.

## 2021-06-09 NOTE — TELEPHONE ENCOUNTER
RN cannot approve Refill Request    RN can NOT refill this medication PCP messaged that patient is overdue for Labs. Last office visit: 7/8/2019 Taniya Blum MD Last Physical: 3/1/2018 Last MTM visit: Visit date not found Last visit same specialty: 3/11/2020 Charity Soares CNP.  Next visit within 3 mo: Visit date not found  Next physical within 3 mo: Visit date not found      Chelsey Anguiano, [facility] Triage/Med Refill 7/19/2020    Requested Prescriptions   Pending Prescriptions Disp Refills     levothyroxine (SYNTHROID, LEVOTHROID) 100 MCG tablet [Pharmacy Med Name: LEVOTHYROXINE 0.100MG (100MCG) TAB] 90 tablet 3     Sig: TAKE 1 TABLET BY MOUTH DAILY AT 6 AM       Thyroid Hormones Protocol Failed - 7/18/2020  8:19 PM        Failed - TSH on file in past 12 months for patient age 12 & older     TSH   Date Value Ref Range Status   07/08/2019 2.79 0.30 - 5.00 uIU/mL Final                   Passed - Provider visit in past 12 months or next 3 months     Last office visit with prescriber/PCP: 7/8/2019 Taniya Blum MD OR same dept: 3/11/2020 Charity Soares CNP OR same specialty: 3/11/2020 Charity Soares CNP  Last physical: 3/1/2018 Last MTM visit: Visit date not found   Next visit within 3 mo: Visit date not found  Next physical within 3 mo: Visit date not found  Prescriber OR PCP: Taniya Blum MD  Last diagnosis associated with med order: 1. Hypothyroidism  - levothyroxine (SYNTHROID, LEVOTHROID) 100 MCG tablet [Pharmacy Med Name: LEVOTHYROXINE 0.100MG (100MCG) TAB]; TAKE 1 TABLET BY MOUTH DAILY AT 6 AM  Dispense: 90 tablet; Refill: 3    If protocol passes may refill for 12 months if within 3 months of last provider visit (or a total of 15 months).

## 2021-06-09 NOTE — PROGRESS NOTES
Erika Obrien is a 55 y.o. female who is being evaluated via a billable telephone visit.        Assessment/Plan:     1. Acquired hypothyroidism  Clinically euthyroid however menopausal symptoms could mask thyroid imbalance.  She will obtain return for a lab only visit to assess thyroid balance.  Continue levothyroxine.  - Thyroid Cascade; Future    2. Hypertension  I advised that she check her blood pressures intermittently.  She will return to clinic for a renal function profile to ensure kidney function and electrolytes are normal on lisinopril hydrochlorothiazide.  Encouraged healthy lifestyle habits.  Nurse only blood pressure check.    3. Chronic Kidney Disease, Stage 3  She continues to adequately manage risk factors.  Will obtain labs as noted to assess for complications of chronic renal disease including vitamin D abnormalities, diabetes, microalbuminuria, dyslipidemia, gout, and parathyroid hormone imbalance.  - Renal Function Profile; Future  - Vitamin D, Total (25-Hydroxy); Future  - Glycosylated Hemoglobin A1c; Future  - Microalbumin, Random Urine; Future  - Lipid [place] FASTING; Future  - Uric Acid; Future  - Parathyroid Hormone Intact; Future    4. Recurrent UTI  Doing well, has Cipro available at the onset of symptoms.    She is up-to-date with preventive care, advised consideration of Shingrix.      There are no Patient Instructions on file for this visit.     Return in about 1 year (around 7/20/2021) for Annual physical.      Subjective:      Erika Obrien is a 55 y.o. female evaluated today by phone visit for follow-up of her chronic conditions.  Hypertension treated with lisinopril hydrochlorothiazide which she is tolerating well.  She denies any lightheadedness, headaches, chest pain, shortness of breath, palpitations, or edema.  Exercising regularly by walking.  Feels her diet is healthy.  She does not check her blood pressures but has a blood pressure cuff at home.  History of hypothyroidism,  tolerating levothyroxine well.  Noting that her weight is increased slightly, does not actually measure her weight.  Also noting some intermittent hot flashes and intermittent sweating, status post endometrial ablation about 7 years ago and thus difficult to know where she is at in menopause.  History of recurrent UTI, has a ciprofloxacin available to her to take at onset of symptoms.  History of chronic kidney disease stage III, we are following this on a yearly basis.    Current Outpatient Medications   Medication Sig Dispense Refill     ascorbic acid (VITAMIN C) 1000 MG tablet Take 1,000 mg by mouth 3 (three) times a day.       BLACK COHOSH ORAL Take by mouth.       calcium carbonate (OS-KAMRON) 600 mg (1,500 mg) tablet Take 600 mg by mouth daily.       cholecalciferol, vitamin D3, (VITAMIN D3) 1,000 unit capsule Take 1,000 Units by mouth daily.       cyclobenzaprine (FLEXERIL) 5 MG tablet Take 1 tablet (5 mg total) by mouth 3 (three) times a day as needed for muscle spasms. 30 tablet 0     giovanni primrose/linoleic/gamoleni (PRIMROSE OIL ORAL) Take by mouth.       levothyroxine (SYNTHROID, LEVOTHROID) 100 MCG tablet TAKE 1 TABLET BY MOUTH DAILY AT 6 AM 90 tablet 3     lisinopril-hydrochlorothiazide (PRINZIDE,ZESTORETIC) 20-25 mg per tablet Take 1 tablet by mouth daily. 90 tablet 3     multivitamin (ONE A DAY) per tablet Take 1 tablet by mouth daily.       OMEGA-3/DHA/EPA/FISH OIL (FISH OIL-OMEGA-3 FATTY ACIDS) 300-1,000 mg capsule Take 2 g by mouth daily.       UNABLE TO FIND Med Name:Shauna Menopause relief       ciprofloxacin HCl (CIPRO) 500 MG tablet Take 1 tablet (500 mg total) by mouth 2 (two) times a day. 20 tablet 2     No current facility-administered medications for this visit.        Past Medical History, Family History, and Social History reviewed.  Past Medical History:   Diagnosis Date     Bladder infection      Hypertension      Hypothyroidism      Kidney stone      Past Surgical History:   Procedure  Laterality Date     VT LIGATE FALLOPIAN TUBE      Description: Tubal Ligation;  Recorded: 12/27/2007;  Annotations: 1995     VT THYROIDECTOMY      Description: Near-Total Thyroidectomy;  Recorded: 09/17/2008;  Comments: due to Graves     TUBAL LIGATION       uterine ablation       No known drug allergies  Family History   Problem Relation Age of Onset     Cancer Mother         lung     Heart disease Mother      Cancer Father         lung     Social History     Socioeconomic History     Marital status:      Spouse name: Not on file     Number of children: Not on file     Years of education: Not on file     Highest education level: Not on file   Occupational History     Not on file   Social Needs     Financial resource strain: Not on file     Food insecurity     Worry: Not on file     Inability: Not on file     Transportation needs     Medical: Not on file     Non-medical: Not on file   Tobacco Use     Smoking status: Never Smoker     Smokeless tobacco: Never Used   Substance and Sexual Activity     Alcohol use: Yes     Alcohol/week: 3.3 standard drinks     Types: 4 Standard drinks or equivalent per week     Drug use: Not on file     Sexual activity: Not on file   Lifestyle     Physical activity     Days per week: Not on file     Minutes per session: Not on file     Stress: Not on file   Relationships     Social connections     Talks on phone: Not on file     Gets together: Not on file     Attends Congregation service: Not on file     Active member of club or organization: Not on file     Attends meetings of clubs or organizations: Not on file     Relationship status: Not on file     Intimate partner violence     Fear of current or ex partner: Not on file     Emotionally abused: Not on file     Physically abused: Not on file     Forced sexual activity: Not on file   Other Topics Concern     Not on file   Social History Narrative     Not on file         Review of systems is as stated in HPI, and the remainder of  "the 10 system review is otherwise negative.    Objective:     There were no vitals filed for this visit. There is no height or weight on file to calculate BMI.  She has not obtained any vitals from home.    Alert female in no respiratory distress, no wheezing.  Mood is appropriate by phone.      This note has been dictated using voice recognition software. Any grammatical or context distortions are unintentional and inherent to the the software.       The patient has been notified of following:     \"This telephone visit will be conducted via a call between you and your physician/provider. We have found that certain health care needs can be provided without the need for a physical exam.  This service lets us provide the care you need with a short phone conversation.  If a prescription is necessary we can send it directly to your pharmacy.  If lab work is needed we can place an order for that and you can then stop by our lab to have the test done at a later time.    Telephone visits are billed at different rates depending on your insurance coverage. During this emergency period, for some insurers they may be billed the same as an in-person visit.  Please reach out to your insurance provider with any questions.    If during the course of the call the physician/provider feels a telephone visit is not appropriate, you will not be charged for this service.\"    Patient has given verbal consent to a Telephone visit? Yes    What phone number would you like to be contacted at? [phone]    Patient would like to receive their AVS by AVS Preference: Gianna.    Phone call duration:  10 minutes    Taniya Blum MD  "

## 2021-06-09 NOTE — TELEPHONE ENCOUNTER
Lm for pt to call and schedule appt for physical or virtual visit for thyroid f/u.  See note below.

## 2021-06-10 NOTE — TELEPHONE ENCOUNTER
Refill Approved    Rx renewed per Medication Renewal Policy. Medication was last renewed on 8/16/19, last OV 7/20/20.    Chelsey Anguiano, [facility] Triage/Med Refill 8/10/2020     Requested Prescriptions   Pending Prescriptions Disp Refills     lisinopriL-hydrochlorothiazide (PRINZIDE,ZESTORETIC) 20-25 mg per tablet [Pharmacy Med Name: LISINOPRIL-HCTZ 20/25MG TABLETS] 90 tablet 3     Sig: TAKE 1 TABLET BY MOUTH ONCE DAILY       Diuretics/Combination Diuretics Refill Protocol  Passed - 8/8/2020 11:28 AM        Passed - Visit with PCP or prescribing provider visit in past 12 months     Last office visit with prescriber/PCP: 7/8/2019 Taniya Blum MD OR same dept: 3/11/2020 Charity Soares CNP OR same specialty: 3/11/2020 Charity Soares CNP  Last physical: 3/1/2018 Last MTM visit: Visit date not found   Next visit within 3 mo: Visit date not found  Next physical within 3 mo: Visit date not found  Prescriber OR PCP: Taniya Blum MD  Last diagnosis associated with med order: 1. Essential hypertension  - lisinopriL-hydrochlorothiazide (PRINZIDE,ZESTORETIC) 20-25 mg per tablet [Pharmacy Med Name: LISINOPRIL-HCTZ 20/25MG TABLETS]; TAKE 1 TABLET BY MOUTH ONCE DAILY  Dispense: 90 tablet; Refill: 3    If protocol passes may refill for 12 months if within 3 months of last provider visit (or a total of 15 months).             Passed - Serum Potassium in past 12 months      Lab Results   Component Value Date    Potassium 3.8 07/22/2020             Passed - Serum Sodium in past 12 months      Lab Results   Component Value Date    Sodium 139 07/22/2020             Passed - Blood pressure on file in past 12 months     BP Readings from Last 1 Encounters:   07/22/20 128/82             Passed - Serum Creatinine in past 12 months      Creatinine   Date Value Ref Range Status   07/22/2020 1.08 0.60 - 1.10 mg/dL Final

## 2021-06-11 NOTE — TELEPHONE ENCOUNTER
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  Caller has concerns do to kidney disease with exposure to covid and question if this is a good idea for her to return fulltime?    Date of last appointment with primary care: 07/20/2020    Okay to leave a detailed message: Yes    Please call and advise patient to her concern.

## 2021-06-11 NOTE — TELEPHONE ENCOUNTER
Current hybrid model, so fewer persons.  Forward plan to return to 100% capacity.  With kidney disease, concerns re: COVID risk.  I provided my support.  Encouraged her to find out from her leaders how she (and all other employees) will remain protected from exposure in this model.  Discussed importance of masks, 6 feet distancing.  VJ

## 2021-06-13 NOTE — PROGRESS NOTES
Patient ID: Erika Obrien is a 52 y.o. female.  /74  Temp 98.1  F (36.7  C)  Wt 200 lb (90.7 kg)  BMI 32.28 kg/m2    Assessment/Plan:                   Diagnoses and all orders for this visit:    UTI (urinary tract infection)    Dysuria  -     Urinalysis-UC if Indicated  -     Culture, Urine    Other orders  -     ciprofloxacin HCl (CIPRO) 500 MG tablet; Take 1 tablet (500 mg total) by mouth 2 (two) times a day.  Dispense: 20 tablet; Refill: 0    DISCUSSION  Urinalysis and clinical findings are consistent with urinary tract infection.  Uncertain as to cause of kidney pain but minimal findings on exam.  She does not have a fever to think this would be a more complicated infection process at this time.  Based on findings and history we will treat with ciprofloxacin.  Previous cultures reviewed as discussed below.  Subjective:     HPI    Erika Obrien is a 52 y.o. female who is here today concerned regarding dysuria, frequency, right-sided back pain.  Patient reports a long-standing history of recurrent urinary tract infections.  She reports undergoing an extensive workup.  She reports having a surgery for urinary reflux at a younger age.  She states whenever she gets urinary symptoms she tends to get pain in the right side back.  She does not have any fevers or chills.  Aside from this mild discomfort that comes in waves she is not reporting any abdominal pain nausea vomiting diarrhea hematuria.  She states she is able to maintain oral intake.  Symptoms have just begun.  She has learned over time if she evaluates and treats appropriately in a timely manner she can prevent any further worsening infection.  She has had a history of rather significant infections including pyelonephritis and sepsis in the past.  Cultures are reviewed noting enterococcus and E. coli have been found on cultures over the past 2 years.  He does not have any antibiotic allergies.    Review of Systems  Complete review of systems is  obtained.  Other than the specific considerations noted above complete review of systems is negative.      Objective:   Medications:  Current Outpatient Prescriptions   Medication Sig Note     PREVIDENT 5000 SENSITIVE 1.1-5 % Pste BRUSH WITH A PEA SIZED AMOUNT 2 XD UTD 10/31/2017: Received from: External Pharmacy     ascorbic acid (VITAMIN C) 1000 MG tablet Take 1,000 mg by mouth 3 (three) times a day.      calcium carbonate (OS-KAMRON) 600 mg (1,500 mg) tablet Take 600 mg by mouth daily.      cholecalciferol, vitamin D3, (VITAMIN D3) 1,000 unit capsule Take 1,000 Units by mouth daily.      ciprofloxacin HCl (CIPRO) 500 MG tablet Take 1 tablet (500 mg total) by mouth 2 (two) times a day.      lactobacillus rhamnosus GG (CULTURELLE) 10-15 Billion cell capsule Take 1 capsule by mouth daily.      levothyroxine (SYNTHROID, LEVOTHROID) 100 MCG tablet TAKE 1 TABLET BY MOUTH DAILY AT 6:00 AM      LEVOTHYROXINE SODIUM (LEVOTHYROXINE ORAL) Take 1 tablet by mouth daily.  12/30/2015: 100mcg     lisinopril-hydrochlorothiazide (PRINZIDE,ZESTORETIC) 20-25 mg per tablet Take 1 tablet by mouth daily.      methenamine (HIPREX) 1 gram tablet Take 1 g by mouth 2 (two) times a day with meals.      multivitamin (ONE A DAY) per tablet Take 1 tablet by mouth daily.      OMEGA-3/DHA/EPA/FISH OIL (FISH OIL-OMEGA-3 FATTY ACIDS) 300-1,000 mg capsule Take 2 g by mouth daily.      Allergies:  Allergies   Allergen Reactions     No Known Drug Allergies      Tobacco:   reports that she has never smoked. She does not have any smokeless tobacco history on file.     Physical Exam      /74  Temp 98.1  F (36.7  C)  Wt 200 lb (90.7 kg)  BMI 32.28 kg/m2        General Appearance:    Alert, cooperative, no distress   Eyes:    No conjunctival irritation, no scleral icterus       Lungs:     Clear to auscultation bilaterally, respirations unlabored   Heart:    Regular rate and rhythm, S1 and S2 normal, no murmur, rub   or gallop   Abdomen:     Soft,  non-tender,     no masses, no organomegaly   Extremities:   Extremities normal, atraumatic, no cyanosis or edema   Skin:   Skin color, texture, turgor normal, no rashes or lesions   Neurologic:   Normal strength and sensation

## 2021-06-16 NOTE — PROGRESS NOTES
Assessment/Plan:     1. Routine general medical examination at a health care facility  Encouraged healthy lifestyle habits including regular exercise, healthy eating habits, and adequate calcium and vitamin D intake.  Will obtain records from [facility] regarding Pap smears and mammograms, both up-to-date.  She has colonoscopy scheduled.  Will check fasting glucose and fasting lipids today.  Medications reviewed and up-to-date.    2. Hypertension  Controlled on current regimen.  Continue.  Encourage healthy lifestyle habits.    3. Acquired hypothyroidism  She remained stable on levothyroxine, continue, will check thyroid cascade today.  - Thyroid Cascade    4. Chronic Kidney Disease, Stage 3  Will obtain labs as noted to assess for underlying complications.  Continue to avoid nephrotoxic agents.  - Vitamin D, Total (25-Hydroxy)  - Uric Acid  - Lipid [place] FASTING  - HM2(CBC w/o Differential)  - Renal Function Profile  - Parathyroid Hormone Intact; Future    5. Recurrent UTI  She may leave urine sample as needed with concerns regarding urinary tract infection given recurrent UTIs in the past.  She is also provided a prescription for ciprofloxacin to use as needed in the event of UTI symptoms.  - Urinalysis-UC if Indicated; Standing         Subjective:     Erika Obrien is a 53 y.o. female who presents for an annual exam.  Been doing very well over the last year.  She feels as though her thyroid is stable.  Remains on lisinopril hydrochlorthiazide for management of hypertension.  Denies lightheadedness, dizziness, headaches, chest pain, dyspnea, or swelling.  She is due for routine labs.  History of recurrent UTI, seen previously by urology.  She is interested in having a prescription for ciprofloxacin as needed with UTI symptoms and ability to leave urinalysis as needed.  Historically we have allowed a urinalysis as needed.  History of vitamin D deficiency that is due for follow-up.    She receives preventive care  through OB gynecologist in [facility].  Recent normal Pap smear and negative for HPV.  Recent normal mammogram.  She has a colonoscopy scheduled.    She is , 3 children.  Her youngest child will be graduating college and she is excited about this, likely will be traveling soon.  Intermittent exercise with walking or hiking.  She does not smoke.  Drinks 4 alcoholic beverages per week.  Works in education as .    Healthy Habits:   Healthy Diet: Yes  Regular Exercise: Yes  Sunscreen Use: Yes  Dental Visits Regularly: Yes  Seat Belt: Yes  Domestic abuse:   No    Health Maintenance reviewed:  Lipid Profile: Due today  Glucose Screen: Due today  Colonoscopy: Scheduled  Mammogram: Done through [facility]    Gynecologic History  No LMP recorded. Patient has had an ablation.  Contraception: none  Last Pap: 2/13/18. Results were: Normal, HPV negative        Immunization History   Administered Date(s) Administered     DT (pediatric) 09/13/2002     Hep A, historic 08/31/2009, 03/11/2010     Hep B, historic 09/23/2002, 10/22/2002, 06/03/2003     Influenza, inj, historic,unspecified 10/06/2015, 10/11/2016, 10/10/2017     Influenza, seasonal,quad inj 36+ mos 10/12/2016     Influenza, seasonal,quad inj 6-35 mos 09/21/2009, 09/25/2014     Td,adult,historic,unspecified 08/31/2009     Tdap 08/31/2009     Immunization status: up to date and documented.    Current Outpatient Prescriptions   Medication Sig Dispense Refill     ascorbic acid (VITAMIN C) 1000 MG tablet Take 1,000 mg by mouth 3 (three) times a day.       calcium carbonate (OS-KAMRON) 600 mg (1,500 mg) tablet Take 600 mg by mouth daily.       cholecalciferol, vitamin D3, (VITAMIN D3) 1,000 unit capsule Take 1,000 Units by mouth daily.       levothyroxine (SYNTHROID, LEVOTHROID) 100 MCG tablet TAKE 1 TABLET BY MOUTH DAILY AT 6:00  tablet 2     lisinopril-hydrochlorothiazide (PRINZIDE,ZESTORETIC) 20-25 mg per tablet TAKE 1 TABLET BY MOUTH DAILY 90 tablet 0      multivitamin (ONE A DAY) per tablet Take 1 tablet by mouth daily.       OMEGA-3/DHA/EPA/FISH OIL (FISH OIL-OMEGA-3 FATTY ACIDS) 300-1,000 mg capsule Take 2 g by mouth daily.       PREVIDENT 5000 SENSITIVE 1.1-5 % Pste BRUSH WITH A PEA SIZED AMOUNT 2 XD UTD  2     ciprofloxacin HCl (CIPRO) 500 MG tablet Take 1 tablet (500 mg total) by mouth 2 (two) times a day. 20 tablet 2     No current facility-administered medications for this visit.      Past Medical History:   Diagnosis Date     Bladder infection      Hypertension      Hypothyroidism      Kidney stone      Past Surgical History:   Procedure Laterality Date     NH LIGATE FALLOPIAN TUBE      Description: Tubal Ligation;  Recorded: 12/27/2007;  Annotations: 1995     NH THYROIDECTOMY      Description: Near-Total Thyroidectomy;  Recorded: 09/17/2008;  Comments: due to Graves     TUBAL LIGATION       uterine ablation       No known drug allergies  Family History   Problem Relation Age of Onset     Cancer Mother      lung     Heart disease Mother      Cancer Father      lung     Social History     Social History     Marital status:      Spouse name: N/A     Number of children: N/A     Years of education: N/A     Occupational History     Not on file.     Social History Main Topics     Smoking status: Never Smoker     Smokeless tobacco: Never Used     Alcohol use 2.0 oz/week     4 Standard drinks or equivalent per week     Drug use: Not on file     Sexual activity: Not on file     Other Topics Concern     Not on file     Social History Narrative       Review of Systems  General:  Denies problem  Eyes: Denies problem  Ears/Nose/Throat: Denies problem  Cardiovascular: Denies problem  Respiratory:  Denies problem  Gastrointestinal:  Denies problem   Genitourinary: Denies problem  Musculoskeletal:  Denies problem  Skin: Denies problem  Neurologic: Denies problem  Psychiatric: Denies problem  Endocrine: Denies problem  Heme/Lymphatic: Denies problem  "  Allergic/Immunologic: Denies problem            Objective:        Vitals:    03/01/18 0748   BP: 120/72   Pulse: 80   Resp: 20   Temp: 98  F (36.7  C)   TempSrc: Oral   SpO2: 99%   Weight: 207 lb (93.9 kg)   Height: 5' 5.75\" (1.67 m)     Body mass index is 33.67 kg/(m^2).    Physical Exam:  General Appearance: Alert, pleasant, appears stated age  Head: Normocephalic, without obvious abnormality  Eyes: PERRL, conjunctiva/corneas clear, EOM's intact  Ears: Normal TM's and external ear canals, both ears  Nose: Nares normal, septum midline,mucosa normal, no drainage  Throat: Lips, mucosa, and tongue normal; teeth and gums normal; oropharynx is clear  Neck: Supple,without lymphadenopathy or thyromegally  Lungs: Clear to auscultation bilaterally, respirations unlabored  Heart: Regular rate and rhythm, no murmur   Breast:  Normal appearance.  No palpable masses, nipple discharge, or skin changes  Abdomen: Soft, non-tender, no masses, no organomegaly  Extremities: Extremities with strong and symmetric pulses, no cyanosis or edema  Skin: Skin color, texture normal, no rashes or lesions  Neurologic: Normal   Pelvic exam: Not indicated               This note has been dictated using voice recognition software. Any grammatical or context distortions are unintentional and inherent to the the software.    "

## 2021-06-17 NOTE — PATIENT INSTRUCTIONS - HE
Patient Instructions by Charity Soares CNP at 4/10/2019  2:20 PM     Author: Charity Soares CNP Service: -- Author Type: Nurse Practitioner    Filed: 4/10/2019  2:45 PM Encounter Date: 4/10/2019 Status: Signed    : Charity Soares CNP (Nurse Practitioner)       Patient Education     Acute Sinusitis    Acute sinusitis is irritation and swelling of the sinuses. It is usually caused by a viral infection after a common cold. Your doctor can help you find relief.  What is acute sinusitis?  Sinuses are air-filled spaces in the skull behind the face. They are kept moist and clean by a lining of mucosa. Things such as pollen, smoke, and chemical fumes can irritate the mucosa. It can then swell up. As a response to irritation, the mucosa makes more mucus and other fluids. Tiny hairlike cilia cover the mucosa. Cilia help carry mucus toward the opening of the sinus. Too much mucus may cause the cilia to stop working. This blocks the sinus opening. A buildup of fluid in the sinuses then causes pain and pressure. It can also encourage bacteria to grow in the sinuses.  Common symptoms of acute sinusitis  You may have:    Facial soreness pain    Headache    Fever    Fluid draining in the back of the throat (postnasal drip)    Congestion    Drainage that is thick and colored, instead of clear    Cough  Diagnosing acute sinusitis  Your doctor will ask about your symptoms and health history. He or she will look at your ear, nose, and throat. You usually won't need to have X-rays taken.    The doctor may take a sample of mucus to check for bacteria. If you have sinusitis that keeps coming back, you may need imaging tests such as X-rays or CAT scans. This will help your doctor check for a structural problem that may be causing the infection.  Treating acute sinusitis  Treatment is aimed at unblocking the sinus opening and helping the cilia work again. You may need to take antihistamine and decongestant medicine. These can  reduce inflammation and decrease the amount of fluid your sinuses make. If you have a bacterial infection, you will need to take antibiotic medicine for 10 to 14 days. Take this medicine until it is gone, even if you feel better.  Date Last Reviewed: 10/1/2016    0332-1116 [employer]. [address] [place] [address]. All rights reserved. This information is not intended as a substitute for professional medical care. Always follow your healthcare professional's instructions.

## 2021-06-19 NOTE — LETTER
Letter by Taniya Blum MD at      Author: Taniya Blum MD Service: -- Author Type: --    Filed:  Encounter Date: 4/22/2019 Status: (Other)         Erika Obrien  [address]  [place] [address]      April 22, 2019      Dear Erika Obrien,    Per our refill protocol, you are due for an annual physical or medication check office visit. Your prescription for Levothyroxine was sent to your pharmacy 04/18/19. Please call [phone] to schedule an office visit with Dr. Taniya Blum before your next refill is needed to avoid delays.    Thank you,  [facility]

## 2021-06-20 NOTE — LETTER
Letter by Charity Soares CNP at      Author: Charity Soares CNP Service: -- Author Type: --    Filed:  Encounter Date: 4/8/2020 Status: (Other)         Erika Obrien  [address]  [place] [address]               April 8, 2020    Dear Erika:    Our records indicate that you are due for a mammogram.    In the [place], one in nine women will develop breast cancer during their lifetime. While there is no way to prevent breast cancer, early detection provides the best opportunity for curing it.    For women over the age of 40, the American Cancer Society recommends a yearly clinical breast exam and a yearly mammogram. These practices have saved thousands of lives. We need your help to ensure that you are receiving optimal medical care.    Please make an appointment for a mammogram ([phone]) at your earliest convenience.    Sincerely,    Charity Soares CNP

## 2021-06-23 NOTE — TELEPHONE ENCOUNTER
Refill Approved    Rx renewed per Medication Renewal Policy. Medication was last renewed on 10/24/18.    Melina Gonzalez, [facility] Triage/Med Refill 1/22/2019     Requested Prescriptions   Pending Prescriptions Disp Refills     levothyroxine (SYNTHROID, LEVOTHROID) 100 MCG tablet [Pharmacy Med Name: LEVOTHYROXINE 0.100MG (100MCG) TAB] 90 tablet 0     Sig: TAKE 1 TABLET BY MOUTH DAILY AT 6 AM    Thyroid Hormones Protocol Passed - 1/21/2019 10:22 AM       Passed - Provider visit in past 12 months or next 3 months    Last office visit with prescriber/PCP: 9/19/2016 Taniya Blum MD OR same dept: Visit date not found OR same specialty: 10/31/2017 Maykel Meek MD  Last physical: 3/1/2018 Last MTM visit: Visit date not found   Next visit within 3 mo: Visit date not found  Next physical within 3 mo: Visit date not found  Prescriber OR PCP: Taniya Blum MD  Last diagnosis associated with med order: 1. Hypothyroidism  - levothyroxine (SYNTHROID, LEVOTHROID) 100 MCG tablet [Pharmacy Med Name: LEVOTHYROXINE 0.100MG (100MCG) TAB]; TAKE 1 TABLET BY MOUTH DAILY AT 6 AM  Dispense: 90 tablet; Refill: 0    If protocol passes may refill for 12 months if within 3 months of last provider visit (or a total of 15 months).            Passed - TSH on file in past 12 months for patient age 12 & older    TSH   Date Value Ref Range Status   03/01/2018 4.66 0.30 - 5.00 uIU/mL Final

## 2021-06-24 NOTE — TELEPHONE ENCOUNTER
Due for labs    Rx renewed per Medication Renewal Policy. Medication was last renewed on 5/31/18.    Alethea Allen, [facility] Triage/Med Refill 2/19/2019     Requested Prescriptions   Pending Prescriptions Disp Refills     lisinopril-hydrochlorothiazide (PRINZIDE,ZESTORETIC) 20-25 mg per tablet [Pharmacy Med Name: LISINOPRIL-HCTZ 20/25MG TABLETS] 90 tablet 0     Sig: TAKE 1 TABLET BY MOUTH DAILY    Diuretics/Combination Diuretics Refill Protocol  Passed - 2/16/2019 12:08 PM       Passed - Visit with PCP or prescribing provider visit in past 12 months    Last office visit with prescriber/PCP: 9/19/2016 Taniya Blum MD OR same dept: Visit date not found OR same specialty: 10/31/2017 Maykel Meek MD  Last physical: 3/1/2018 Last MTM visit: Visit date not found   Next visit within 3 mo: Visit date not found  Next physical within 3 mo: Visit date not found  Prescriber OR PCP: Taniya Blum MD  Last diagnosis associated with med order: 1. Essential hypertension  - lisinopril-hydrochlorothiazide (PRINZIDE,ZESTORETIC) 20-25 mg per tablet [Pharmacy Med Name: LISINOPRIL-HCTZ 20/25MG TABLETS]; TAKE 1 TABLET BY MOUTH DAILY  Dispense: 90 tablet; Refill: 0    If protocol passes may refill for 12 months if within 3 months of last provider visit (or a total of 15 months).            Passed - Serum Potassium in past 12 months     Lab Results   Component Value Date    Potassium 3.9 03/01/2018            Passed - Serum Sodium in past 12 months     Lab Results   Component Value Date    Sodium 135 (L) 03/01/2018            Passed - Blood pressure on file in past 12 months    BP Readings from Last 1 Encounters:   07/15/18 126/86            Passed - Serum Creatinine in past 12 months     Creatinine   Date Value Ref Range Status   03/01/2018 1.04 0.60 - 1.10 mg/dL Final

## 2021-06-26 ENCOUNTER — HEALTH MAINTENANCE LETTER (OUTPATIENT)
Age: 56
End: 2021-06-26

## 2021-06-26 NOTE — PROGRESS NOTES
Progress Notes by Joe Riley PA-C at 7/15/2018  9:50 AM     Author: Joe Riley PA-C Service: -- Author Type: Physician Assistant    Filed: 7/15/2018 10:23 AM Encounter Date: 7/15/2018 Status: Signed    : Joe Riley PA-C (Physician Assistant)       Subjective:      Patient ID: Erika Obrien is a 53 y.o. female.    Chief Complaint:    HPI  Erika Obrien is a 53 y.o. female who presents today complaining of one day history of irritative voiding symptoms to include urinary hesitancy urgency frequency and dysuria.  Patient denies gross hematuria.  Denies fever chills night sweats fatigue or other red flag symptoms to include back or flank pain and no vaginal discharge.      She has had a previous history of a cyst in the kidney with surgical correction and she has had recurrent urinary tract infections intermittently afterwards.  Her last urinary tract infection was in May which was successfully treated.    Past Medical History:   Diagnosis Date   ? Bladder infection    ? Hypertension    ? Hypothyroidism    ? Kidney stone        Past Surgical History:   Procedure Laterality Date   ? [place] LIGATE FALLOPIAN TUBE      Description: Tubal Ligation;  Recorded: 12/27/2007;  Annotations: 1995   ? [place] THYROIDECTOMY      Description: Near-Total Thyroidectomy;  Recorded: 09/17/2008;  Comments: due to Graves   ? TUBAL LIGATION     ? uterine ablation         Family History   Problem Relation Age of Onset   ? Cancer Mother      lung   ? Heart disease Mother    ? Cancer Father      lung       Social History   Substance Use Topics   ? Smoking status: Never Smoker   ? Smokeless tobacco: Never Used   ? Alcohol use 2.0 oz/week     4 Standard drinks or equivalent per week       Review of Systems  As above in HPI, otherwise negative.    Objective:     /86  Pulse 80  Temp 98.1  F (36.7  C) (Oral)   Resp 14  Wt 186 lb (84.4 kg)  SpO2 98%  BMI 30.25 kg/m2    Physical Exam   Constitutional: She is oriented to person,  place, and time. She appears well-developed and well-nourished. No distress.   HENT:   Head: Normocephalic and atraumatic.   Mouth/Throat: No oropharyngeal exudate.   Eyes: EOM are normal. Pupils are equal, round, and reactive to light. No scleral icterus.   Neck: Normal range of motion. Neck supple.   Cardiovascular: Normal rate and regular rhythm.    Pulmonary/Chest: Effort normal and breath sounds normal.   Abdominal: Soft. She exhibits no mass. There is no tenderness. There is no rebound and no guarding.   No CVA tenderness to percussion  There is suprapubic tenderness to palpation and does reproduce the sense to urinate   Neurological: She is alert and oriented to person, place, and time.   Skin: Skin is dry. No rash noted. She is not diaphoretic.   Psychiatric: She has a normal mood and affect.     Lab:  Recent Results (from the past 24 hour(s))   Urinalysis-UC if Indicated   Result Value Ref Range    Color, UA Yellow Colorless, Yellow, Straw, Light Yellow    Clarity, UA Clear Clear    Glucose, UA Negative Negative    Bilirubin, UA Negative Negative    Ketones, UA Negative Negative    Specific Gravity, UA 1.015 1.005 - 1.030    Blood, UA Moderate (!) Negative    pH, UA 7.0 5.0 - 8.0    Protein, UA 30 mg/dL (!) Negative mg/dL    Urobilinogen, UA 0.2 E.U./dL 0.2 E.U./dL, 1.0 E.U./dL    Nitrite, UA Negative Negative    Leukocytes, UA Moderate (!) Negative       Assessment:     Procedures    The primary encounter diagnosis was Urinary tract infection. A diagnosis of Dysuria was also pertinent to this visit.    Plan:     1. Urinary tract infection  Urinalysis-UC if Indicated    cefdinir (OMNICEF) 300 MG capsule   2. Dysuria  Urinalysis-UC if Indicated    cefdinir (OMNICEF) 300 MG capsule         Patient Instructions     Increased fluids and rest.  Discussed signs and symptoms of ascending urinary tract infection symptoms to include pyelonephritis. Instructed to turn to clinic if there are increased fever chills  night sweats fatigue abdominal pain or flank pain  Antibiotic as written. Risks and benefits of medication discussed.  Indication for return to clinic.    As a result of our visit today, here are the action plans for you:    1. Medication(s) to stop: There are no discontinued medications.    2. Medication(s) to start or change:   Medications Ordered   Medications   ? cefdinir (OMNICEF) 300 MG capsule     Sig: Take 1 capsule (300 mg total) by mouth 2 (two) times a day for 10 days.     Dispense:  20 capsule     Refill:  0       3. Other instructions: Yes      Urinary Tract Infections in Women    Urinary tract infections (UTIs) are most often caused by bacteria (germs). These bacteria enter the urinary tract. The bacteria may come from outside the body. Or they may travel from the skin outside the rectum or vagina into the urethra. Female anatomy makes it easier for bacteria from the bowel to enter a womans urinary tract, which is the most common source of UTI. This means women develop UTIs more often than men. Pain in or around the urinary tract is a common UTI symptom. But the only way to know for sure if you have a UTI for the health care provider to test your urine. The two tests that may be done are the urinalysis and urine culture.  Types of UTIs    Cystitis: A bladder infection (cystitis) is the most common UTI in women. You may have urgent or frequent urination. You may also have pain, burning when you urinate, and bloody urine.    Urethritis: This is an inflamed urethra, which is the tube that carries urine from the bladder to outside the body. You may have lower stomach or back pain. You may also have urgent or frequent urination.    Pyelonephritis: This is a kidney infection. If not treated, it can be serious and damage your kidneys. In severe cases, you may be hospitalized. You may have a fever and lower back pain.  Medications to treat a UTI  Most UTIs are treated with antibiotics. These kill the bacteria.  The length of time you need to take them depends on the type of infection. It may be as short as 3 days. If you have repeated UTIs, a low-dose antibiotic may be needed for several months. Take antibiotics exactly as directed. Dont stop taking them until all of the medication is gone. If you stop taking the antibiotic too soon, the infection may not go away, and you may develop a resistance to the antibiotic. This can make it much harder to treat.  Lifestyle changes to treat and prevent UTIs  The lifestyle changes below will help get rid of your UTI. They may also help prevent future UTIs.    Drink plenty of fluids. This includes water, juice, or other caffeine-free drinks. Fluids help flush bacteria out of your body.    Empty your bladder. Always empty your bladder when you feel the urge to urinate. And always urinate before going to sleep. Urine that stays in your bladder can lead to infection. Try to urinate before and after sex as well.    Practice good personal hygiene. Wipe yourself from front to back after using the toilet. This helps keep bacteria from getting into the urethra.    Use condoms during sex. These help prevent UTIs caused by sexually transmitted bacteria. Also, avoid using spermicides during sex. These can increase the risk of UTIs. Choose other forms of birth control instead. For women who tend to get UTIs after sex, a low-dose of a preventive antibiotic may be used. Be sure to discuss this option with your health care provider.    Follow up with your health care provider as directed. He or she may test to make sure the infection has cleared. If necessary, additional treatment may be started.  Date Last Reviewed: 9/8/2014 2000-2016 [employer]. [address] [place] [address], [place] [address]. All rights reserved. This information is not intended as a substitute for professional medical care. Always follow your healthcare professional's instructions.

## 2021-06-29 NOTE — PROGRESS NOTES
Progress Notes by Tiffani Sullivan MA at 7/22/2020  8:30 AM     Author: Tiffani Sullivan MA Service: -- Author Type: Medical Assistant    Filed: 7/22/2020  8:07 AM Encounter Date: 7/22/2020 Status: Attested    : Tiffani Sullivan MA (Medical Assistant) Cosigner: Taniya Blum MD at 7/22/2020 10:18 AM    Attestation signed by Taniya Blum MD at 7/22/2020 10:18 AM    I agree with plan of care as outlined.  VJ                 I met with Erika Obrien at the request of Dr. Blum to recheck her blood pressure.  Blood pressure medications on the MAR were reviewed with patient.    Patient has taken all medications as per usual regimen: Yes  Patient reports tolerating them without any issues or concerns: No    Vitals:    07/22/20 0804   BP: 128/82   Patient Site: Left Arm   Patient Position: Sitting   Cuff Size: Adult Regular       Blood pressure was taken, previous encounter was reviewed, recorded blood pressure below 140/90.  Patient was discharged and the note will be sent to the provider for final review.     Patient's own cuff readings: 8:01 am: 138/95 and 8:05 am 135/90     Patient will go home and try to figure out how to calibrate her blood pressure machine to get more accurate readings.

## 2021-07-03 NOTE — ADDENDUM NOTE
Addendum Note by Supriya Larkin LPN at 7/8/2019  1:30 PM     Author: Supriya Larkin LPN Service: -- Author Type: Licensed Nurse    Filed: 7/8/2019  2:55 PM Encounter Date: 7/8/2019 Status: Signed    : Supriya Larkin LPN (Licensed Nurse)    Addended by: SUPRIYA LARKIN on: 7/8/2019 02:55 PM        Modules accepted: Orders

## 2021-08-16 ENCOUNTER — OFFICE VISIT (OUTPATIENT)
Dept: FAMILY MEDICINE | Facility: CLINIC | Age: 56
End: 2021-08-16
Payer: COMMERCIAL

## 2021-08-16 VITALS
TEMPERATURE: 98.1 F | BODY MASS INDEX: 33.43 KG/M2 | SYSTOLIC BLOOD PRESSURE: 126 MMHG | RESPIRATION RATE: 20 BRPM | HEART RATE: 79 BPM | OXYGEN SATURATION: 99 % | DIASTOLIC BLOOD PRESSURE: 82 MMHG | WEIGHT: 213 LBS | HEIGHT: 67 IN

## 2021-08-16 DIAGNOSIS — Z11.59 ENCOUNTER FOR HEPATITIS C SCREENING TEST FOR LOW RISK PATIENT: ICD-10-CM

## 2021-08-16 DIAGNOSIS — E03.9 ACQUIRED HYPOTHYROIDISM: ICD-10-CM

## 2021-08-16 DIAGNOSIS — N39.0 RECURRENT UTI: ICD-10-CM

## 2021-08-16 DIAGNOSIS — N18.31 STAGE 3A CHRONIC KIDNEY DISEASE (H): ICD-10-CM

## 2021-08-16 DIAGNOSIS — I10 ESSENTIAL HYPERTENSION: Primary | ICD-10-CM

## 2021-08-16 LAB
ALBUMIN SERPL-MCNC: 4.4 G/DL (ref 3.5–5)
ANION GAP SERPL CALCULATED.3IONS-SCNC: 12 MMOL/L (ref 5–18)
BUN SERPL-MCNC: 10 MG/DL (ref 8–22)
CALCIUM SERPL-MCNC: 10.2 MG/DL (ref 8.5–10.5)
CHLORIDE BLD-SCNC: 95 MMOL/L (ref 98–107)
CHOLEST SERPL-MCNC: 216 MG/DL
CO2 SERPL-SCNC: 26 MMOL/L (ref 22–31)
CREAT SERPL-MCNC: 0.89 MG/DL (ref 0.6–1.1)
ERYTHROCYTE [DISTWIDTH] IN BLOOD BY AUTOMATED COUNT: 14 % (ref 10–15)
FASTING STATUS PATIENT QL REPORTED: YES
GFR SERPL CREATININE-BSD FRML MDRD: 74 ML/MIN/1.73M2
GLUCOSE BLD-MCNC: 95 MG/DL (ref 70–125)
HBA1C MFR BLD: 5.3 % (ref 0–5.6)
HCT VFR BLD AUTO: 36.3 % (ref 35–47)
HDLC SERPL-MCNC: 64 MG/DL
HGB BLD-MCNC: 12.3 G/DL (ref 11.7–15.7)
LDLC SERPL CALC-MCNC: 122 MG/DL
MCH RBC QN AUTO: 29.2 PG (ref 26.5–33)
MCHC RBC AUTO-ENTMCNC: 33.9 G/DL (ref 31.5–36.5)
MCV RBC AUTO: 86 FL (ref 78–100)
PHOSPHATE SERPL-MCNC: 2.8 MG/DL (ref 2.5–4.5)
PLATELET # BLD AUTO: 217 10E3/UL (ref 150–450)
POTASSIUM BLD-SCNC: 3.8 MMOL/L (ref 3.5–5)
PTH-INTACT SERPL-MCNC: 44 PG/ML (ref 10–86)
RBC # BLD AUTO: 4.21 10E6/UL (ref 3.8–5.2)
SODIUM SERPL-SCNC: 133 MMOL/L (ref 136–145)
TRIGL SERPL-MCNC: 150 MG/DL
TSH SERPL DL<=0.005 MIU/L-ACNC: 2.35 UIU/ML (ref 0.3–5)
URATE SERPL-MCNC: 5.5 MG/DL (ref 2–7.5)
WBC # BLD AUTO: 5.5 10E3/UL (ref 4–11)

## 2021-08-16 PROCEDURE — 86803 HEPATITIS C AB TEST: CPT | Performed by: FAMILY MEDICINE

## 2021-08-16 PROCEDURE — 84443 ASSAY THYROID STIM HORMONE: CPT | Performed by: FAMILY MEDICINE

## 2021-08-16 PROCEDURE — 80069 RENAL FUNCTION PANEL: CPT | Performed by: FAMILY MEDICINE

## 2021-08-16 PROCEDURE — 84550 ASSAY OF BLOOD/URIC ACID: CPT | Performed by: FAMILY MEDICINE

## 2021-08-16 PROCEDURE — 82306 VITAMIN D 25 HYDROXY: CPT | Performed by: FAMILY MEDICINE

## 2021-08-16 PROCEDURE — 99214 OFFICE O/P EST MOD 30 MIN: CPT | Performed by: FAMILY MEDICINE

## 2021-08-16 PROCEDURE — 80061 LIPID PANEL: CPT | Performed by: FAMILY MEDICINE

## 2021-08-16 PROCEDURE — 85027 COMPLETE CBC AUTOMATED: CPT | Performed by: FAMILY MEDICINE

## 2021-08-16 PROCEDURE — 83036 HEMOGLOBIN GLYCOSYLATED A1C: CPT | Performed by: FAMILY MEDICINE

## 2021-08-16 PROCEDURE — 36415 COLL VENOUS BLD VENIPUNCTURE: CPT | Performed by: FAMILY MEDICINE

## 2021-08-16 PROCEDURE — 83970 ASSAY OF PARATHORMONE: CPT | Performed by: FAMILY MEDICINE

## 2021-08-16 RX ORDER — PROGESTERONE 200 MG/1
200 CAPSULE ORAL AT BEDTIME
COMMUNITY
End: 2022-07-23

## 2021-08-16 RX ORDER — CIPROFLOXACIN 500 MG/1
500 TABLET, FILM COATED ORAL 2 TIMES DAILY
Qty: 20 TABLET | Refills: 2 | Status: SHIPPED | OUTPATIENT
Start: 2021-08-16 | End: 2021-12-06

## 2021-08-16 ASSESSMENT — MIFFLIN-ST. JEOR: SCORE: 1598.79

## 2021-08-16 NOTE — PROGRESS NOTES
Assessment/Plan:     Hypertension  Adequately controlled with current regimen.  Encouraged continued efforts at healthy lifestyle habits.  Will check renal function profile and monitoring of her medication.  - Renal panel; Future    Stage 3a chronic kidney disease  Continue to avoid nephrotoxic agents.  Will recheck renal function today along with evaluation for secondary effects including anemia, vitamin D deficiency, diabetes, hyperparathyroidism, and gout.  Will check fasting lipids to look further cardiovascular risks.  - Renal panel; Future  - CBC with platelets; Future  - Vitamin D Deficiency; Future  - Hemoglobin A1c; Future  - Parathyroid Hormone Intact; Future  - Lipid panel reflex to direct LDL Fasting; Future  - Uric acid; Future    Recurrent UTI  Refill provided of ciprofloxacin to initiate should she develop UTI symptoms.  - ciprofloxacin (CIPRO) 500 MG tablet; Take 1 tablet (500 mg) by mouth 2 times daily    Acquired hypothyroidism  Clinically euthyroid, continue levothyroxine.  Will check thyroid cascade today.  - TSH; Future    Encounter for hepatitis C screening test for low risk patient  We will screen for hepatitis C today per current guidelines.  - Hepatitis C antibody; Future    Please abstract the following data from this visit with this patient into the appropriate field in Epic:    Other Tests found in the patient's chart through Chart Review/Care Everywhere:    Pap smear done by this group [facility] this date: 10/15/2020          There are no Patient Instructions on file for this visit.     Return in about 1 year (around 8/16/2022) for Annual Preventive Care Visit.      Subjective:      Erika Obrien is a 56 year old female presented clinic today for follow-up of her chronic health conditions.  Hypertension managed with lisinopril hydrochlorothiazide, tolerating well.  Insert hypertension.  No recent home blood pressure checks.  Prior history of chronic kidney disease stage III due for  follow-up.  History of recurrent urinary tract infections, has ciprofloxacin on hand to begin should she develop symptoms of this seems to work very well for her.  This was recommended previously by urology, interested in having this prescription on hand.  Remains on levothyroxine management of hypothyroidism.    She is exercising regularly by walking daily and also playing tennis, feeling good if she does so.  Sleeping well for the most part with intermittent mild insomnia that is short-lived.  Feels she is doing well in regards to diet overall.  Pap smear is obtained through [facility].  She has her mammogram scheduled.  She is agreeable to hepatitis C screening today.    Current Outpatient Medications   Medication Sig Dispense Refill     ascorbic acid (VITAMIN C) 1000 MG tablet [ASCORBIC ACID (VITAMIN C) 1000 MG TABLET] Take 1,000 mg by mouth 3 (three) times a day.       calcium carbonate (OS-KAMRON) 600 mg (1,500 mg) tablet [CALCIUM CARBONATE (OS-KAMRON) 600 MG (1,500 MG) TABLET] Take 600 mg by mouth daily.       cholecalciferol, vitamin D3, (VITAMIN D3) 1,000 unit capsule [CHOLECALCIFEROL, VITAMIN D3, (VITAMIN D3) 1,000 UNIT CAPSULE] Take 1,000 Units by mouth daily.       ciprofloxacin (CIPRO) 500 MG tablet Take 1 tablet (500 mg) by mouth 2 times daily 20 tablet 2     cyclobenzaprine (FLEXERIL) 5 MG tablet [CYCLOBENZAPRINE (FLEXERIL) 5 MG TABLET] Take 1 tablet (5 mg total) by mouth 3 (three) times a day as needed for muscle spasms. 30 tablet 0     levothyroxine (SYNTHROID/LEVOTHROID) 100 MCG tablet TAKE 1 TABLET BY MOUTH DAILY AT 6 AM 90 tablet 0     lisinopril-hydrochlorothiazide (ZESTORETIC) 20-25 MG tablet TAKE 1 TABLET BY MOUTH DAILY 90 tablet 0     multivitamin (ONE A DAY) per tablet [MULTIVITAMIN (ONE A DAY) PER TABLET] Take 1 tablet by mouth daily.       OMEGA-3/DHA/EPA/FISH OIL (FISH OIL-OMEGA-3 FATTY ACIDS) 300-1,000 mg capsule [OMEGA-3/DHA/EPA/FISH OIL (FISH OIL-OMEGA-3 FATTY ACIDS) 300-1,000 MG CAPSULE] Take 2  g by mouth daily.       progesterone (PROMETRIUM) 200 MG capsule Take 200 mg by mouth At Bedtime         Past Medical History, Family History, and Social History reviewed.  Past Medical History:   Diagnosis Date     Bladder infection      Hypertension      Hypothyroidism      Kidney stone      Past Surgical History:   Procedure Laterality Date     [place] LIGATE FALLOPIAN TUBE      Description: Tubal Ligation;  Recorded: 12/27/2007;  Annotations: 1995     OTHER SURGICAL HISTORY      uterine ablation     [place] THYROIDECTOMY      Description: Near-Total Thyroidectomy;  Recorded: 09/17/2008;  Comments: due to Graves     TUBAL LIGATION       No known drug allergies  Family History   Problem Relation Age of Onset     Cancer Mother         lung     Heart Disease Mother      Cancer Father         lung     Social History     Socioeconomic History     Marital status:      Spouse name: Not on file     Number of children: Not on file     Years of education: Not on file     Highest education level: Not on file   Occupational History     Not on file   Tobacco Use     Smoking status: Never Smoker     Smokeless tobacco: Never Used   Substance and Sexual Activity     Alcohol use: Yes     Alcohol/week: 3.3 standard drinks     Drug use: Not on file     Sexual activity: Not on file   Other Topics Concern     Not on file   Social History Narrative     Not on file     Social Determinants of Health     Financial Resource Strain:      Difficulty of Paying Living Expenses:    Food Insecurity:      Worried About Running Out of Food in the Last Year:      Ran Out of Food in the Last Year:    Transportation Needs:      Lack of Transportation (Medical):      Lack of Transportation (Non-Medical):    Physical Activity:      Days of Exercise per Week:      Minutes of Exercise per Session:    Stress:      Feeling of Stress :    Social Connections:      Frequency of Communication with Friends and Family:      Frequency of Social Gatherings with  "Friends and Family:      Attends [employer] Services:      Active Member of Clubs or Organizations:      Attends Club or Organization Meetings:      Marital Status:    Intimate Partner Violence:      Fear of Current or Ex-Partner:      Emotionally Abused:      Physically Abused:      Sexually Abused:          Review of systems is as stated in HPI, and the remainder of the 10 system review is otherwise negative.    Objective:     Vitals:    08/16/21 1551   BP: 126/82   Pulse: 79   Resp: 20   Temp: 98.1  F (36.7  C)   TempSrc: Oral   SpO2: 99%   Weight: 96.6 kg (213 lb)   Height: 1.702 m (5' 7\")    Body mass index is 33.36 kg/m .    Alert and pleasant female.  Mucous membranes moist.  Heart with regular rate and rhythm.  Lungs clear.  Neck supple without of adenopathy or thyromegaly.  Extremities without edema.      This note has been dictated using voice recognition software. Any grammatical or context distortions are unintentional and inherent to the the software.     "

## 2021-08-17 LAB
DEPRECATED CALCIDIOL+CALCIFEROL SERPL-MC: 45 UG/L (ref 30–80)
HCV AB SERPL QL IA: NEGATIVE

## 2021-08-21 ENCOUNTER — HEALTH MAINTENANCE LETTER (OUTPATIENT)
Age: 56
End: 2021-08-21

## 2021-08-23 DIAGNOSIS — E87.1 HYPONATREMIA: Primary | ICD-10-CM

## 2021-08-23 NOTE — RESULT ENCOUNTER NOTE
"Your cholesterol looks pretty good overall.  Your triglycerides are just slightly elevated, but not of concern and your LDL or \"bad cholesterol\" has improved since last year.  Your HDL or \"good cholesterol\" continues to look great!"

## 2021-08-23 NOTE — RESULT ENCOUNTER NOTE
Your kidney function looks good, in fact it is the best we have seen in quite some time.  Your sodium and chloride levels are a little bit low.  We sometimes see these in the settings of diuretics.  It would be a good idea to recheck this sometime in the next 1 to 2 months at a lab only visit, but it is not of significant concern with the mild abnormality we are seeing.

## 2021-09-07 ENCOUNTER — ANCILLARY PROCEDURE (OUTPATIENT)
Dept: MAMMOGRAPHY | Facility: CLINIC | Age: 56
End: 2021-09-07
Attending: FAMILY MEDICINE
Payer: COMMERCIAL

## 2021-09-07 DIAGNOSIS — Z12.31 VISIT FOR SCREENING MAMMOGRAM: ICD-10-CM

## 2021-09-07 PROCEDURE — 77063 BREAST TOMOSYNTHESIS BI: CPT

## 2021-10-16 ENCOUNTER — HEALTH MAINTENANCE LETTER (OUTPATIENT)
Age: 56
End: 2021-10-16

## 2021-11-22 ENCOUNTER — TELEPHONE (OUTPATIENT)
Dept: FAMILY MEDICINE | Facility: CLINIC | Age: 56
End: 2021-11-22
Payer: COMMERCIAL

## 2021-11-22 NOTE — TELEPHONE ENCOUNTER
Reason for Call:  Other call back    Detailed comments: Recvd call from pt/Erika, she had a VV w/Charity Soares NP (11.17.2021) due to vertigo, she was placed on a RX/Meclizine 25 MG, this RX is not helping her and Erika would like to know if there is another RX that could be called into her pharmacy    Phone Number Patient can be reached at: Home number on file [phone] (home)    Best Time: anytime    Can we leave a detailed message on this number? YES    Call taken on 11/22/2021 at 11:22 AM by Clare Clark

## 2021-11-22 NOTE — TELEPHONE ENCOUNTER
LDM for pt/Erika, relayed mess per Dr Taniya Blum. Provided clinic scheduling # [phone] option 1 to call bk to schedule an in clinic visit for a follow up/vertigo

## 2021-12-06 ENCOUNTER — OFFICE VISIT (OUTPATIENT)
Dept: FAMILY MEDICINE | Facility: CLINIC | Age: 56
End: 2021-12-06
Payer: COMMERCIAL

## 2021-12-06 VITALS
HEART RATE: 82 BPM | WEIGHT: 214 LBS | TEMPERATURE: 97.6 F | DIASTOLIC BLOOD PRESSURE: 82 MMHG | SYSTOLIC BLOOD PRESSURE: 130 MMHG | RESPIRATION RATE: 16 BRPM | HEIGHT: 67 IN | OXYGEN SATURATION: 98 % | BODY MASS INDEX: 33.59 KG/M2

## 2021-12-06 DIAGNOSIS — H81.12 BENIGN PAROXYSMAL POSITIONAL VERTIGO OF LEFT EAR: Primary | ICD-10-CM

## 2021-12-06 PROCEDURE — 99213 OFFICE O/P EST LOW 20 MIN: CPT | Performed by: FAMILY MEDICINE

## 2021-12-06 RX ORDER — MECLIZINE HYDROCHLORIDE 25 MG/1
25 TABLET ORAL 3 TIMES DAILY PRN
Qty: 30 TABLET | Refills: 0 | Status: SHIPPED | OUTPATIENT
Start: 2021-12-06 | End: 2022-07-23

## 2021-12-06 RX ORDER — ONDANSETRON 4 MG/1
4 TABLET, FILM COATED ORAL EVERY 8 HOURS PRN
Qty: 20 TABLET | Refills: 1 | Status: SHIPPED | OUTPATIENT
Start: 2021-12-06 | End: 2022-07-23

## 2021-12-06 ASSESSMENT — MIFFLIN-ST. JEOR: SCORE: 1603.33

## 2021-12-06 NOTE — PROGRESS NOTES
"  Assessment/Plan:     Benign paroxysmal positional vertigo of left ear  Examination today is benign.  We reviewed nature of condition.  Unclear if travel or flight could be contributing.  No underlying ear disease identified.  No evidence of underlying neurologic condition.  Referral is placed to physical therapy for management, resume meclizine, prescription Fida for Zofran to help with nausea.  If persisting, onset of additional symptoms, or worsening she will let me know and would have low threshold for referral to ENT.  - Physical Therapy Referral; Future  - meclizine (ANTIVERT) 25 MG tablet; Take 1 tablet (25 mg) by mouth 3 times daily as needed for dizziness  - ondansetron (ZOFRAN) 4 MG tablet; Take 1 tablet (4 mg) by mouth every 8 hours as needed for nausea      There are no Patient Instructions on file for this visit.     No follow-ups on file.      Subjective:      Erika Obrien is a 56 year old female presenting to clinic today for evaluation of ongoing vertigo symptoms.  She was initially evaluated November 17 by virtual visit, diagnosed with likely BPPV and started on meclizine.  Was not helpful.  She did the Epley maneuvers at home and noted that the maneuvers of the left ear initially were very helpful in resolving her significant \"big spins\" but continues to have some intermittent vertigo especially when laying flat at night and rolling to the side.  Continues to feel like her balance in general is off.  Feeling fatigued.  Intermittent nausea especially towards the end of the day.  Intermittently having some vomiting as well.  She is not had any hearing changes, ringing in her ears, perhaps left ear feels a little bit full.  Denies any sinus symptoms.  Denies any focal neurologic symptoms including vision changes, speech or swallowing difficulties, numbness, tingling, weakness, or bladder changes.  No known injuries.    Current Outpatient Medications   Medication Sig Dispense Refill     ascorbic acid " (VITAMIN C) 1000 MG tablet [ASCORBIC ACID (VITAMIN C) 1000 MG TABLET] Take 1,000 mg by mouth 3 (three) times a day.       calcium carbonate (OS-KAMRON) 600 mg (1,500 mg) tablet [CALCIUM CARBONATE (OS-KAMRON) 600 MG (1,500 MG) TABLET] Take 600 mg by mouth daily.       cholecalciferol, vitamin D3, (VITAMIN D3) 1,000 unit capsule [CHOLECALCIFEROL, VITAMIN D3, (VITAMIN D3) 1,000 UNIT CAPSULE] Take 1,000 Units by mouth daily.       levothyroxine (SYNTHROID/LEVOTHROID) 100 MCG tablet TAKE 1 TABLET BY MOUTH DAILY AT 6 AM 90 tablet 1     lisinopril-hydrochlorothiazide (ZESTORETIC) 20-25 MG tablet TAKE 1 TABLET BY MOUTH DAILY 90 tablet 3     meclizine (ANTIVERT) 25 MG tablet Take 1 tablet (25 mg) by mouth 3 times daily as needed for dizziness 30 tablet 0     multivitamin (ONE A DAY) per tablet [MULTIVITAMIN (ONE A DAY) PER TABLET] Take 1 tablet by mouth daily.       OMEGA-3/DHA/EPA/FISH OIL (FISH OIL-OMEGA-3 FATTY ACIDS) 300-1,000 mg capsule [OMEGA-3/DHA/EPA/FISH OIL (FISH OIL-OMEGA-3 FATTY ACIDS) 300-1,000 MG CAPSULE] Take 2 g by mouth daily.       ondansetron (ZOFRAN) 4 MG tablet Take 1 tablet (4 mg) by mouth every 8 hours as needed for nausea 20 tablet 1     progesterone (PROMETRIUM) 200 MG capsule Take 200 mg by mouth At Bedtime         Past Medical History, Family History, and Social History reviewed.  Past Medical History:   Diagnosis Date     Bladder infection      Hypertension      Hypothyroidism      Kidney stone      Past Surgical History:   Procedure Laterality Date     C LIGATE FALLOPIAN TUBE      Description: Tubal Ligation;  Recorded: 12/27/2007;  Annotations: 1995     OTHER SURGICAL HISTORY      uterine ablation     UT THYROIDECTOMY      Description: Near-Total Thyroidectomy;  Recorded: 09/17/2008;  Comments: due to Graves     TUBAL LIGATION       No known drug allergies  Family History   Problem Relation Age of Onset     Cancer Mother         lung     Heart Disease Mother      Cancer Father         lung     Social  "History     Socioeconomic History     Marital status:      Spouse name: Not on file     Number of children: Not on file     Years of education: Not on file     Highest education level: Not on file   Occupational History     Not on file   Tobacco Use     Smoking status: Never Smoker     Smokeless tobacco: Never Used   Substance and Sexual Activity     Alcohol use: Yes     Alcohol/week: 3.3 standard drinks     Drug use: Not on file     Sexual activity: Not on file   Other Topics Concern     Not on file   Social History Narrative     Not on file     Social Determinants of Health     Financial Resource Strain: Not on file   Food Insecurity: Not on file   Transportation Needs: Not on file   Physical Activity: Not on file   Stress: Not on file   Social Connections: Not on file   Intimate Partner Violence: Not on file   Housing Stability: Not on file         Review of systems is as stated in HPI, and the remainder of the 10 system review is otherwise negative.    Objective:     Vitals:    12/06/21 1214   BP: 130/82   Pulse: 82   Resp: 16   Temp: 97.6  F (36.4  C)   TempSrc: Tympanic   SpO2: 98%   Weight: 97.1 kg (214 lb)   Height: 1.702 m (5' 7\")    Body mass index is 33.52 kg/m .    Alert and pleasant female.  Pupils equal, round, and reactive to light.  Extraocular movements intact.  Face moves symmetrically.  Neck supple without of adenopathy.  Heart with regular rate and rhythm.  Lungs clear.  5-5 strength in all 4 extremities.  Deep tendon reflexes symmetric.  No clonus.  Rapid alternating movements and finger-nose-finger testing intact.  Normal Romberg.  Tandem gait intact.      This note has been dictated using voice recognition software. Any grammatical or context distortions are unintentional and inherent to the the software.     "

## 2022-01-20 DIAGNOSIS — E03.4 HYPOTHYROIDISM DUE TO ACQUIRED ATROPHY OF THYROID: ICD-10-CM

## 2022-01-23 RX ORDER — LEVOTHYROXINE SODIUM 100 UG/1
TABLET ORAL
Qty: 90 TABLET | Refills: 1 | OUTPATIENT
Start: 2022-01-23

## 2022-04-27 DIAGNOSIS — E03.4 HYPOTHYROIDISM DUE TO ACQUIRED ATROPHY OF THYROID: ICD-10-CM

## 2022-05-01 RX ORDER — LEVOTHYROXINE SODIUM 100 UG/1
TABLET ORAL
Qty: 90 TABLET | Refills: 1 | Status: SHIPPED | OUTPATIENT
Start: 2022-05-01 | End: 2022-07-25

## 2022-05-01 NOTE — TELEPHONE ENCOUNTER
"Last Written Prescription Date:  10/21/21  Last Fill Quantity: 90,  # refills: 1   Last office visit provider:  12/6/21     Requested Prescriptions   Pending Prescriptions Disp Refills     levothyroxine (SYNTHROID/LEVOTHROID) 100 MCG tablet [Pharmacy Med Name: LEVOTHYROXINE 0.100MG (100MCG) TAB] 90 tablet 1     Sig: TAKE 1 TABLET BY MOUTH DAILY AT 6 AM       Thyroid Protocol Passed - 5/1/2022  7:53 AM        Passed - Patient is 12 years or older        Passed - Recent (12 mo) or future (30 days) visit within the authorizing provider's specialty     Patient has had an office visit with the authorizing provider or a provider within the authorizing providers department within the previous 12 mos or has a future within next 30 days. See \"Patient Info\" tab in inbasket, or \"Choose Columns\" in Meds & Orders section of the refill encounter.              Passed - Medication is active on med list        Passed - Normal TSH on file in past 12 months     Recent Labs   Lab Test 08/16/21  1628   TSH 2.35              Passed - No active pregnancy on record     If patient is pregnant or has had a positive pregnancy test, please check TSH.          Passed - No positive pregnancy test in past 12 months     If patient is pregnant or has had a positive pregnancy test, please check TSH.               Tyson Licona RN 05/01/22 7:53 AM    "

## 2022-07-05 ENCOUNTER — MYC MEDICAL ADVICE (OUTPATIENT)
Dept: FAMILY MEDICINE | Facility: CLINIC | Age: 57
End: 2022-07-05

## 2022-07-05 DIAGNOSIS — E03.9 ACQUIRED HYPOTHYROIDISM: ICD-10-CM

## 2022-07-05 DIAGNOSIS — N18.31 STAGE 3A CHRONIC KIDNEY DISEASE (H): Primary | ICD-10-CM

## 2022-07-05 DIAGNOSIS — I10 ESSENTIAL HYPERTENSION: ICD-10-CM

## 2022-07-06 NOTE — TELEPHONE ENCOUNTER
Routing to Dr. Blum to review/advise on labs that may be needed prior to patient's virtual visit.    Shyann Gandhi RN on 7/6/2022 at 7:33 AM

## 2022-07-18 ENCOUNTER — ALLIED HEALTH/NURSE VISIT (OUTPATIENT)
Dept: FAMILY MEDICINE | Facility: CLINIC | Age: 57
End: 2022-07-18
Payer: COMMERCIAL

## 2022-07-18 ENCOUNTER — LAB (OUTPATIENT)
Dept: LAB | Facility: CLINIC | Age: 57
End: 2022-07-18
Payer: COMMERCIAL

## 2022-07-18 VITALS — HEART RATE: 54 BPM | DIASTOLIC BLOOD PRESSURE: 84 MMHG | SYSTOLIC BLOOD PRESSURE: 136 MMHG

## 2022-07-18 DIAGNOSIS — I10 ESSENTIAL HYPERTENSION: Primary | ICD-10-CM

## 2022-07-18 DIAGNOSIS — I10 ESSENTIAL HYPERTENSION: ICD-10-CM

## 2022-07-18 DIAGNOSIS — N18.31 STAGE 3A CHRONIC KIDNEY DISEASE (H): ICD-10-CM

## 2022-07-18 DIAGNOSIS — E87.1 HYPONATREMIA: ICD-10-CM

## 2022-07-18 DIAGNOSIS — E03.9 ACQUIRED HYPOTHYROIDISM: ICD-10-CM

## 2022-07-18 LAB
ALBUMIN SERPL BCG-MCNC: 4.7 G/DL (ref 3.5–5.2)
ANION GAP SERPL CALCULATED.3IONS-SCNC: 11 MMOL/L (ref 7–15)
BUN SERPL-MCNC: 17.4 MG/DL (ref 6–20)
CALCIUM SERPL-MCNC: 9.8 MG/DL (ref 8.6–10)
CHLORIDE SERPL-SCNC: 100 MMOL/L (ref 98–107)
CHOLEST SERPL-MCNC: 257 MG/DL
CREAT SERPL-MCNC: 1.05 MG/DL (ref 0.51–0.95)
DEPRECATED CALCIDIOL+CALCIFEROL SERPL-MC: 52 UG/L (ref 20–75)
DEPRECATED HCO3 PLAS-SCNC: 28 MMOL/L (ref 22–29)
ERYTHROCYTE [DISTWIDTH] IN BLOOD BY AUTOMATED COUNT: 13.7 % (ref 10–15)
GFR SERPL CREATININE-BSD FRML MDRD: 62 ML/MIN/1.73M2
GLUCOSE SERPL-MCNC: 103 MG/DL (ref 70–99)
HBA1C MFR BLD: 5.4 % (ref 0–5.6)
HCT VFR BLD AUTO: 39 % (ref 35–47)
HDLC SERPL-MCNC: 61 MG/DL
HGB BLD-MCNC: 12.9 G/DL (ref 11.7–15.7)
LDLC SERPL CALC-MCNC: 171 MG/DL
MCH RBC QN AUTO: 28.9 PG (ref 26.5–33)
MCHC RBC AUTO-ENTMCNC: 33.1 G/DL (ref 31.5–36.5)
MCV RBC AUTO: 87 FL (ref 78–100)
NONHDLC SERPL-MCNC: 196 MG/DL
PHOSPHATE SERPL-MCNC: 3.1 MG/DL (ref 2.5–4.5)
PLATELET # BLD AUTO: 228 10E3/UL (ref 150–450)
POTASSIUM SERPL-SCNC: 4.1 MMOL/L (ref 3.4–5.3)
PTH-INTACT SERPL-MCNC: 34 PG/ML (ref 15–65)
RBC # BLD AUTO: 4.47 10E6/UL (ref 3.8–5.2)
SODIUM SERPL-SCNC: 139 MMOL/L (ref 136–145)
TRIGL SERPL-MCNC: 127 MG/DL
TSH SERPL DL<=0.005 MIU/L-ACNC: 4.25 UIU/ML (ref 0.3–4.2)
URATE SERPL-MCNC: 5.4 MG/DL (ref 2.4–5.7)
WBC # BLD AUTO: 4.1 10E3/UL (ref 4–11)

## 2022-07-18 PROCEDURE — 82306 VITAMIN D 25 HYDROXY: CPT

## 2022-07-18 PROCEDURE — 36415 COLL VENOUS BLD VENIPUNCTURE: CPT

## 2022-07-18 PROCEDURE — 99207 PR NO CHARGE NURSE ONLY: CPT

## 2022-07-18 PROCEDURE — 83036 HEMOGLOBIN GLYCOSYLATED A1C: CPT

## 2022-07-18 PROCEDURE — 84443 ASSAY THYROID STIM HORMONE: CPT

## 2022-07-18 PROCEDURE — 84100 ASSAY OF PHOSPHORUS: CPT

## 2022-07-18 PROCEDURE — 83970 ASSAY OF PARATHORMONE: CPT

## 2022-07-18 PROCEDURE — 85027 COMPLETE CBC AUTOMATED: CPT

## 2022-07-18 PROCEDURE — 80051 ELECTROLYTE PANEL: CPT

## 2022-07-18 PROCEDURE — 82565 ASSAY OF CREATININE: CPT

## 2022-07-18 PROCEDURE — 82310 ASSAY OF CALCIUM: CPT

## 2022-07-18 PROCEDURE — 82040 ASSAY OF SERUM ALBUMIN: CPT

## 2022-07-18 PROCEDURE — 84520 ASSAY OF UREA NITROGEN: CPT

## 2022-07-18 PROCEDURE — 80061 LIPID PANEL: CPT

## 2022-07-18 PROCEDURE — 84550 ASSAY OF BLOOD/URIC ACID: CPT

## 2022-07-23 ENCOUNTER — HEALTH MAINTENANCE LETTER (OUTPATIENT)
Age: 57
End: 2022-07-23

## 2022-07-25 ENCOUNTER — VIRTUAL VISIT (OUTPATIENT)
Dept: FAMILY MEDICINE | Facility: CLINIC | Age: 57
End: 2022-07-25
Payer: COMMERCIAL

## 2022-07-25 DIAGNOSIS — I10 ESSENTIAL HYPERTENSION: ICD-10-CM

## 2022-07-25 DIAGNOSIS — N39.0 RECURRENT UTI: ICD-10-CM

## 2022-07-25 DIAGNOSIS — E03.4 HYPOTHYROIDISM DUE TO ACQUIRED ATROPHY OF THYROID: ICD-10-CM

## 2022-07-25 DIAGNOSIS — N18.31 STAGE 3A CHRONIC KIDNEY DISEASE (H): Primary | ICD-10-CM

## 2022-07-25 PROCEDURE — 99214 OFFICE O/P EST MOD 30 MIN: CPT | Mod: 95 | Performed by: FAMILY MEDICINE

## 2022-07-25 RX ORDER — CIPROFLOXACIN 500 MG/1
500 TABLET, FILM COATED ORAL 2 TIMES DAILY
Qty: 20 TABLET | Refills: 1 | Status: SHIPPED | OUTPATIENT
Start: 2022-07-25 | End: 2023-01-13

## 2022-07-25 RX ORDER — LEVOTHYROXINE SODIUM 100 UG/1
TABLET ORAL
Qty: 90 TABLET | Refills: 3 | Status: SHIPPED | OUTPATIENT
Start: 2022-07-25 | End: 2023-07-12

## 2022-07-25 RX ORDER — CIPROFLOXACIN 500 MG/1
500 TABLET, FILM COATED ORAL 2 TIMES DAILY
COMMUNITY
Start: 2022-07-11 | End: 2022-07-25

## 2022-07-25 RX ORDER — LISINOPRIL AND HYDROCHLOROTHIAZIDE 20; 25 MG/1; MG/1
1 TABLET ORAL DAILY
Qty: 90 TABLET | Refills: 3 | Status: SHIPPED | OUTPATIENT
Start: 2022-07-25 | End: 2023-07-13

## 2022-07-25 NOTE — PROGRESS NOTES
"Erika is a 57 year old who is being evaluated via a billable video visit.      How would you like to obtain your AVS? MyChart  If the video visit is dropped, the invitation should be resent by: Text to cell phone: [phone]  Will anyone else be joining your video visit? No          Assessment & Plan     Stage 3a chronic kidney disease (H)  Renal function overall is stable.  There is been a change in reference range for lab work which could potentially impact this.  Overall looks good however.  Continue risk factor modification.    Hypothyroidism due to acquired atrophy of thyroid  Mildly undertreated.  She is going to take her calcium supplement at a different time of day, we will recheck her TSH in 6 weeks.  Suspect dyslipidemia may be related to hypothyroidism, therefore we will recheck cholesterol at that follow-up visit as well.  - levothyroxine (SYNTHROID/LEVOTHROID) 100 MCG tablet; TAKE 1 TABLET BY MOUTH DAILY AT 6 AM  - TSH; Future    Essential hypertension  Controlled on current regimen.  Continue.  Will reassess lipids at her lab only visit.  - lisinopril-hydrochlorothiazide (ZESTORETIC) 20-25 MG tablet; Take 1 tablet by mouth daily  - Lipid panel reflex to direct LDL Fasting; Future    Recurrent UTI  Refill provided of Cipro, she may leave urine sample as needed for infection.  - ciprofloxacin (CIPRO) 500 MG tablet; Take 1 tablet (500 mg) by mouth 2 times daily             BMI:   Estimated body mass index is 33.52 kg/m  as calculated from the following:    Height as of 12/6/21: 1.702 m (5' 7\").    Weight as of 12/6/21: 97.1 kg (214 lb).   Weight management plan: Discussed healthy diet and exercise guidelines        No follow-ups on file.   Follow-up Visit   Expected date: Sep 05, 2022      Follow Up Appointment Details:     Follow-up with whom?: Other Primary Care Services    Follow-Up for what?: Lab Visit                    Taniya Blum MD  [facility]    Meredith James is a " "57 year old, presenting for the following health issues:  Hypertension, Thyroid follow up, and Kidney Stone(s) Followup (/)      Doing well overall.  Home blood pressures for the most part have been under good control.  She has been eating very healthy, follows a vegetarian diet.  Exercising by walking 45 to 60 mg daily.  On review, she has been taking her levothyroxine with her calcium supplement but remains compliant with the levothyroxine otherwise.  Avoids nephrotoxic agents.  Some struggles with sleep maintenance.  Discussed sleep hygiene.    History of Present Illness       Reason for visit:  Review blood tests and renew meds I am currently taking.    She eats 4 or more servings of fruits and vegetables daily.She consumes 0 sweetened beverage(s) daily.She exercises with enough effort to increase her heart rate 30 to 60 minutes per day.  She exercises with enough effort to increase her heart rate 6 days per week.   She is taking medications regularly.             Review of Systems         Objective    Vitals - Patient Reported  Systolic (Patient Reported): 110  Diastolic (Patient Reported): 78  Blood pressure taken with manual cuff (will exclude from quality measure): Yes  Weight (Patient Reported): 91.2 kg (201 lb)  Height (Patient Reported): 507 cm (16' 7.61\")  BMI (Based on Pt Reported Ht/Wt): 3.55  SpO2 (Patient Reported): 98  Pulse (Patient Reported): 82  Pain Score: No Pain (0)      Vitals:  No vitals were obtained today due to virtual visit.    Physical Exam   GENERAL: Healthy, alert and no distress  EYES: Eyes grossly normal to inspection.  No discharge or erythema, or obvious scleral/conjunctival abnormalities.  RESP: No audible wheeze, cough, or visible cyanosis.  No visible retractions or increased work of breathing.    SKIN: Visible skin clear. No significant rash, abnormal pigmentation or lesions.  NEURO: Cranial nerves grossly intact.  Mentation and speech appropriate for age.  PSYCH: Mentation " appears normal, affect normal/bright, judgement and insight intact, normal speech and appearance well-groomed.                Video-Visit Details    Video Start Time: 8:52    Type of service:  Video Visit    Video End Time:9:06    Originating Location (pt. Location): Home    Distant Location (provider location):  [facility]     Platform used for Video Visit: Eveo    Brenda Gutierrez.

## 2022-09-06 ENCOUNTER — LAB (OUTPATIENT)
Dept: LAB | Facility: CLINIC | Age: 57
End: 2022-09-06
Payer: COMMERCIAL

## 2022-09-06 DIAGNOSIS — I10 ESSENTIAL HYPERTENSION: ICD-10-CM

## 2022-09-06 DIAGNOSIS — N39.0 RECURRENT UTI: ICD-10-CM

## 2022-09-06 DIAGNOSIS — N18.31 STAGE 3A CHRONIC KIDNEY DISEASE (H): Primary | ICD-10-CM

## 2022-09-06 DIAGNOSIS — E03.4 HYPOTHYROIDISM DUE TO ACQUIRED ATROPHY OF THYROID: ICD-10-CM

## 2022-09-06 LAB
ALBUMIN UR-MCNC: ABNORMAL MG/DL
APPEARANCE UR: CLEAR
BACTERIA #/AREA URNS HPF: ABNORMAL /HPF
BILIRUB UR QL STRIP: NEGATIVE
CHOLEST SERPL-MCNC: 217 MG/DL
COLOR UR AUTO: YELLOW
CREAT UR-MCNC: 161 MG/DL
GLUCOSE UR STRIP-MCNC: NEGATIVE MG/DL
HDLC SERPL-MCNC: 62 MG/DL
HGB UR QL STRIP: NEGATIVE
KETONES UR STRIP-MCNC: ABNORMAL MG/DL
LDLC SERPL CALC-MCNC: 130 MG/DL
LEUKOCYTE ESTERASE UR QL STRIP: ABNORMAL
MICROALBUMIN UR-MCNC: 13.6 MG/L
MICROALBUMIN/CREAT UR: 8.45 MG/G CR (ref 0–25)
NITRATE UR QL: NEGATIVE
NONHDLC SERPL-MCNC: 155 MG/DL
PH UR STRIP: 7 [PH] (ref 5–8)
RBC #/AREA URNS AUTO: ABNORMAL /HPF
SP GR UR STRIP: 1.02 (ref 1–1.03)
SQUAMOUS #/AREA URNS AUTO: ABNORMAL /LPF
TRIGL SERPL-MCNC: 124 MG/DL
TSH SERPL DL<=0.005 MIU/L-ACNC: 1.39 UIU/ML (ref 0.3–4.2)
UROBILINOGEN UR STRIP-ACNC: 0.2 E.U./DL
WBC #/AREA URNS AUTO: ABNORMAL /HPF

## 2022-09-06 PROCEDURE — 36415 COLL VENOUS BLD VENIPUNCTURE: CPT

## 2022-09-06 PROCEDURE — 81001 URINALYSIS AUTO W/SCOPE: CPT

## 2022-09-06 PROCEDURE — 87086 URINE CULTURE/COLONY COUNT: CPT

## 2022-09-06 PROCEDURE — 84443 ASSAY THYROID STIM HORMONE: CPT

## 2022-09-06 PROCEDURE — 80061 LIPID PANEL: CPT

## 2022-09-06 PROCEDURE — 82043 UR ALBUMIN QUANTITATIVE: CPT

## 2022-09-08 LAB — BACTERIA UR CULT: NORMAL

## 2022-10-01 ENCOUNTER — HEALTH MAINTENANCE LETTER (OUTPATIENT)
Age: 57
End: 2022-10-01

## 2022-12-21 ENCOUNTER — ANCILLARY PROCEDURE (OUTPATIENT)
Dept: MAMMOGRAPHY | Facility: HOSPITAL | Age: 57
End: 2022-12-21
Attending: FAMILY MEDICINE
Payer: COMMERCIAL

## 2022-12-21 DIAGNOSIS — Z12.31 SCREENING MAMMOGRAM, ENCOUNTER FOR: ICD-10-CM

## 2022-12-21 PROCEDURE — 77067 SCR MAMMO BI INCL CAD: CPT

## 2023-01-12 DIAGNOSIS — N39.0 RECURRENT UTI: ICD-10-CM

## 2023-01-13 RX ORDER — CIPROFLOXACIN 500 MG/1
TABLET, FILM COATED ORAL
Qty: 20 TABLET | Refills: 1 | Status: SHIPPED | OUTPATIENT
Start: 2023-01-13 | End: 2024-08-21

## 2023-01-13 NOTE — TELEPHONE ENCOUNTER
Routing refill request to provider for review/approval because:  Drug not on the [facility] refill protocol     Last Written Prescription Date:  7/25/22  Last Fill Quantity: 20,  # refills: 1   Last office visit provider:  7/25/22     Requested Prescriptions   Pending Prescriptions Disp Refills     ciprofloxacin (CIPRO) 500 MG tablet [Pharmacy Med Name: CIPROFLOXACIN 500MG TABLETS] 20 tablet 1     Sig: TAKE 1 TABLET(500 MG) BY MOUTH TWICE DAILY       There is no refill protocol information for this order          Tyson Licona RN 01/13/23 8:36 AM

## 2023-07-13 ENCOUNTER — TELEPHONE (OUTPATIENT)
Dept: FAMILY MEDICINE | Facility: CLINIC | Age: 58
End: 2023-07-13
Payer: COMMERCIAL

## 2023-07-13 NOTE — TELEPHONE ENCOUNTER
Medication Question or Refill        What medication are you calling about (include dose and sig)?:    levothyroxine (SYNTHROID/LEVOTHROID) 100 MCG tablet      lisinopril-hydrochlorothiazide (ZESTORETIC) 20-25 MG tablet         Preferred Pharmacy:   [id number] - [place], [place] - [address]  AT [facility] [address] [place] [id number]  Phone: [phone] Fax: [phone]      Controlled Substance Agreement on file:   CSA -- Patient Level:    CSA: None found at the patient level.       Who prescribed the medication?: PCP    Do you need a refill? Yes    When did you use the medication last? na    Patient offered an appointment? Yes: pt scheduled soonest appointment for 8/24 with pcp but will need an extension on both prescriptions. Please call back to discuss.    Do you have any questions or concerns?  No      Could we send this information to you in [place] or would you prefer to receive a phone call?:   Patient would prefer a phone call   Okay to leave a detailed message?: Yes at Cell number on file:    Telephone Information:   Mobile [phone]

## 2023-07-13 NOTE — TELEPHONE ENCOUNTER
Both medication refill requests sent to PCP to be reviewed on 7/12/23.    KELL Nina, RN  [facility]

## 2023-08-24 ENCOUNTER — OFFICE VISIT (OUTPATIENT)
Dept: FAMILY MEDICINE | Facility: CLINIC | Age: 58
End: 2023-08-24
Payer: COMMERCIAL

## 2023-08-24 VITALS
DIASTOLIC BLOOD PRESSURE: 80 MMHG | SYSTOLIC BLOOD PRESSURE: 122 MMHG | OXYGEN SATURATION: 99 % | HEART RATE: 100 BPM | TEMPERATURE: 98 F | BODY MASS INDEX: 30.45 KG/M2 | HEIGHT: 67 IN | WEIGHT: 194 LBS | RESPIRATION RATE: 16 BRPM

## 2023-08-24 DIAGNOSIS — E03.9 ACQUIRED HYPOTHYROIDISM: ICD-10-CM

## 2023-08-24 DIAGNOSIS — N39.0 RECURRENT UTI: ICD-10-CM

## 2023-08-24 DIAGNOSIS — I10 ESSENTIAL HYPERTENSION: ICD-10-CM

## 2023-08-24 DIAGNOSIS — N18.31 STAGE 3A CHRONIC KIDNEY DISEASE (H): Primary | ICD-10-CM

## 2023-08-24 LAB
ANION GAP SERPL CALCULATED.3IONS-SCNC: 12 MMOL/L (ref 7–15)
BUN SERPL-MCNC: 12.3 MG/DL (ref 6–20)
CALCIUM SERPL-MCNC: 9.9 MG/DL (ref 8.6–10)
CHLORIDE SERPL-SCNC: 101 MMOL/L (ref 98–107)
CHOLEST SERPL-MCNC: 195 MG/DL
CREAT SERPL-MCNC: 1.06 MG/DL (ref 0.51–0.95)
CREAT UR-MCNC: 75.3 MG/DL
DEPRECATED HCO3 PLAS-SCNC: 26 MMOL/L (ref 22–29)
ERYTHROCYTE [DISTWIDTH] IN BLOOD BY AUTOMATED COUNT: 13.5 % (ref 10–15)
GFR SERPL CREATININE-BSD FRML MDRD: 61 ML/MIN/1.73M2
GLUCOSE SERPL-MCNC: 109 MG/DL (ref 70–99)
HCT VFR BLD AUTO: 40.2 % (ref 35–47)
HDLC SERPL-MCNC: 57 MG/DL
HGB BLD-MCNC: 13.3 G/DL (ref 11.7–15.7)
LDLC SERPL CALC-MCNC: 116 MG/DL
MCH RBC QN AUTO: 28.7 PG (ref 26.5–33)
MCHC RBC AUTO-ENTMCNC: 33.1 G/DL (ref 31.5–36.5)
MCV RBC AUTO: 87 FL (ref 78–100)
MICROALBUMIN UR-MCNC: <12 MG/L
MICROALBUMIN/CREAT UR: NORMAL MG/G{CREAT}
NONHDLC SERPL-MCNC: 138 MG/DL
PLATELET # BLD AUTO: 231 10E3/UL (ref 150–450)
POTASSIUM SERPL-SCNC: 4 MMOL/L (ref 3.4–5.3)
PTH-INTACT SERPL-MCNC: 28 PG/ML (ref 15–65)
RBC # BLD AUTO: 4.64 10E6/UL (ref 3.8–5.2)
SODIUM SERPL-SCNC: 139 MMOL/L (ref 136–145)
T4 FREE SERPL-MCNC: 2.37 NG/DL (ref 0.9–1.7)
TRIGL SERPL-MCNC: 108 MG/DL
TSH SERPL DL<=0.005 MIU/L-ACNC: 0.03 UIU/ML (ref 0.3–4.2)
URATE SERPL-MCNC: 4.5 MG/DL (ref 2.4–5.7)
WBC # BLD AUTO: 3.9 10E3/UL (ref 4–11)

## 2023-08-24 PROCEDURE — 82306 VITAMIN D 25 HYDROXY: CPT | Performed by: FAMILY MEDICINE

## 2023-08-24 PROCEDURE — 36415 COLL VENOUS BLD VENIPUNCTURE: CPT | Performed by: FAMILY MEDICINE

## 2023-08-24 PROCEDURE — 84439 ASSAY OF FREE THYROXINE: CPT | Performed by: FAMILY MEDICINE

## 2023-08-24 PROCEDURE — 83970 ASSAY OF PARATHORMONE: CPT | Performed by: FAMILY MEDICINE

## 2023-08-24 PROCEDURE — 82043 UR ALBUMIN QUANTITATIVE: CPT | Performed by: FAMILY MEDICINE

## 2023-08-24 PROCEDURE — 80061 LIPID PANEL: CPT | Performed by: FAMILY MEDICINE

## 2023-08-24 PROCEDURE — 99214 OFFICE O/P EST MOD 30 MIN: CPT | Performed by: FAMILY MEDICINE

## 2023-08-24 PROCEDURE — 84550 ASSAY OF BLOOD/URIC ACID: CPT | Performed by: FAMILY MEDICINE

## 2023-08-24 PROCEDURE — 82570 ASSAY OF URINE CREATININE: CPT | Performed by: FAMILY MEDICINE

## 2023-08-24 PROCEDURE — 85027 COMPLETE CBC AUTOMATED: CPT | Performed by: FAMILY MEDICINE

## 2023-08-24 PROCEDURE — 84443 ASSAY THYROID STIM HORMONE: CPT | Performed by: FAMILY MEDICINE

## 2023-08-24 PROCEDURE — 80048 BASIC METABOLIC PNL TOTAL CA: CPT | Performed by: FAMILY MEDICINE

## 2023-08-24 RX ORDER — LEVOTHYROXINE SODIUM 100 UG/1
100 TABLET ORAL DAILY
Qty: 90 TABLET | Refills: 4 | Status: SHIPPED | OUTPATIENT
Start: 2023-08-24 | End: 2023-09-03

## 2023-08-24 RX ORDER — LISINOPRIL AND HYDROCHLOROTHIAZIDE 20; 25 MG/1; MG/1
1 TABLET ORAL DAILY
Qty: 90 TABLET | Refills: 0 | Status: SHIPPED | OUTPATIENT
Start: 2023-08-24 | End: 2024-02-19

## 2023-08-24 RX ORDER — PHENTERMINE HYDROCHLORIDE 37.5 MG/1
TABLET ORAL
COMMUNITY
Start: 2023-08-01 | End: 2024-08-20

## 2023-08-24 NOTE — PROGRESS NOTES
"  Assessment & Plan     Stage 3a chronic kidney disease (H)  We will screen for microalbuminuria, anemia, vitamin D deficiency, hyperparathyroidism, and gout.  We will reassess renal function today.  Continue to avoid nephrotoxic agents.  - BASIC METABOLIC PANEL; Future  - Lipid panel reflex to direct LDL Fasting; Future  - Albumin Random Urine Quantitative with Creat Ratio; Future  - CBC with platelets; Future  - Parathyroid Hormone Intact; Future  - Vitamin D Deficiency; Future  - Uric acid; Future    Recurrent UTI  Ofloxacin on hand in the event of UTI symptoms.  This has been working very well for her.    Hypertension  Courage continued efforts at healthy lifestyle habits.  Blood pressure remains controlled despite addition of phentermine.  Continue lisinopril hydrochlorothiazide.  We will check renal function and electrolytes today.  - lisinopril-hydrochlorothiazide (ZESTORETIC) 20-25 MG tablet; Take 1 tablet by mouth daily    Acquired hypothyroidism  Stable and controlled.  We will check TSH today.  Continue levothyroxine.  - TSH WITH FREE T4 REFLEX; Future  - levothyroxine (SYNTHROID/LEVOTHROID) 100 MCG tablet; Take 1 tablet (100 mcg) by mouth daily    Elevated heart rate today likely related to the addition of phentermine.  Advised that she check her heart rate regularly and keep in touch with her endocrinologist as I would recommend avoiding long-term tachycardia.             BMI:   Estimated body mass index is 30.39 kg/m  as calculated from the following:    Height as of this encounter: 1.702 m (5' 7\").    Weight as of this encounter: 88 kg (194 lb 0.1 oz).           Taniya Blum MD  [facility]   Erika is a 58 year old, presenting for the following health issues:  Hypertension, Thyroid Disease, lab work (fasting), and Medication Follow-up (No refills needed today, future refills)      8/24/2023     8:33 AM   Additional Questions   Roomed by david Zhao well and " "feeling well.  No single UTI so far this year and doing well with Cipro immediately at onset.  Remains on lisinopril hydrochlorothiazide in management of hypertension.  Denies lightheadedness, dizziness, headaches, chest pain, dyspnea, or swelling.  Started phentermine under the direction of endocrinology for weight loss, tolerating well and it has been helpful, we note elevation in her resting heart rate today, she has not otherwise been monitoring this.  Is also following intermittent fasting, has more nutritional awareness especially within her vegetarian diet.  Remains on levothyroxine and management of hypothyroidism, that seems to be working well for her.    Stress of late as her brother-in-law who is 45 years old otherwise very healthy was diagnosed with multiple myeloma, working with [facility] [employer] so frequent driving as patient has been supporting him.    Thyroid Disease    History of Present Illness       Hypertension: She presents for follow up of hypertension.  She does not check blood pressure  regularly outside of the clinic. Outpatient blood pressures have not been over 140/90. She does not follow a low salt diet.     Hypothyroidism:     Since last visit, patient describes the following symptoms::  Weight loss    Weight loss::  16-20 lbs.    She eats 4 or more servings of fruits and vegetables daily.She consumes 0 sweetened beverage(s) daily.She exercises with enough effort to increase her heart rate 30 to 60 minutes per day.  She exercises with enough effort to increase her heart rate 5 days per week.   She is taking medications regularly.               Review of Systems         Objective    /80   Pulse 100   Temp 98  F (36.7  C) (Oral)   Resp 16   Ht 1.702 m (5' 7\")   Wt 88 kg (194 lb 0.1 oz)   LMP  (LMP Unknown)   SpO2 99%   BMI 30.39 kg/m    Body mass index is 30.39 kg/m .  Physical Exam   Alert and pleasant.  Mucous membranes moist.  Neck supple without adenopathy or thyromegaly.  Scar " from previous thyroidectomy.  Heart with regular rate and rhythm, heart rate around 100 bpm on my exam.  Lungs clear.  Extremities without edema.                      Answers submitted by the patient for this visit:  Hypertension Visit (Submitted on 8/20/2023)  Chief Complaint: Chronic problems general questions HPI Form  Do you check your blood pressure regularly outside of the clinic?: No  Are your blood pressures ever more than 140 on the top number (systolic) OR more than 90 on the bottom number (diastolic)? (For example, greater than 140/90): No  Are you following a low salt diet?: No  Hypothyroid  (Submitted on 8/20/2023)  Chief Complaint: Chronic problems general questions HPI Form  Since last visit, patient describes the following symptoms:: Weight loss  Weight loss (Submitted on 8/20/2023)  Chief Complaint: Chronic problems general questions HPI Form  Weight loss: : 16-20 lbs.  General Questionnaire (Submitted on 8/20/2023)  Chief Complaint: Chronic problems general questions HPI Form  How many servings of fruits and vegetables do you eat daily?: 4 or more  On average, how many sweetened beverages do you drink each day (Examples: soda, juice, sweet tea, etc.  Do NOT count diet or artificially sweetened beverages)?: 0  How many minutes a day do you exercise enough to make your heart beat faster?: 30 to 60  How many days a week do you exercise enough to make your heart beat faster?: 5  How many days per week do you miss taking your medication?: 0

## 2023-08-24 NOTE — PATIENT INSTRUCTIONS
An eye on your heart rate.  If your resting heart rate is >90 and frequently >100 beats per minute, I would recommend considering an alternative to phentermine.  Keep in touch with your endocrinologist.

## 2023-08-25 LAB — DEPRECATED CALCIDIOL+CALCIFEROL SERPL-MC: 71 UG/L (ref 20–75)

## 2023-09-03 DIAGNOSIS — E03.9 ACQUIRED HYPOTHYROIDISM: ICD-10-CM

## 2023-09-03 RX ORDER — LEVOTHYROXINE SODIUM 100 UG/1
TABLET ORAL
Qty: 90 TABLET | Refills: 4 | Status: SHIPPED | OUTPATIENT
Start: 2023-09-03 | End: 2023-10-06

## 2023-09-03 NOTE — RESULT ENCOUNTER NOTE
Your thyroid is mildly overtreated.  I'd like you adjust your levothyroxine by taking just one half tab 1 day a week and continuing the full tablet the other 6 days of the week.  We should recheck your thyroid again at a lab only visit in about 6 weeks.  My apologies for the delayed response to results.

## 2023-09-05 NOTE — RESULT ENCOUNTER NOTE
Result note from PCP seen on 9/3/23 at 3:14 PM - no outreach required at this time.    ALEC NinaN, RN  [facility]

## 2023-10-05 ENCOUNTER — LAB (OUTPATIENT)
Dept: LAB | Facility: CLINIC | Age: 58
End: 2023-10-05
Payer: COMMERCIAL

## 2023-10-05 DIAGNOSIS — E03.9 ACQUIRED HYPOTHYROIDISM: ICD-10-CM

## 2023-10-05 LAB
T4 FREE SERPL-MCNC: 1.86 NG/DL (ref 0.9–1.7)
TSH SERPL DL<=0.005 MIU/L-ACNC: 0.12 UIU/ML (ref 0.3–4.2)

## 2023-10-05 PROCEDURE — 84439 ASSAY OF FREE THYROXINE: CPT

## 2023-10-05 PROCEDURE — 84443 ASSAY THYROID STIM HORMONE: CPT

## 2023-10-05 PROCEDURE — 36415 COLL VENOUS BLD VENIPUNCTURE: CPT

## 2023-10-06 ENCOUNTER — TELEPHONE (OUTPATIENT)
Dept: FAMILY MEDICINE | Facility: CLINIC | Age: 58
End: 2023-10-06
Payer: COMMERCIAL

## 2023-10-06 DIAGNOSIS — E03.9 ACQUIRED HYPOTHYROIDISM: ICD-10-CM

## 2023-10-06 RX ORDER — LEVOTHYROXINE SODIUM 100 UG/1
TABLET ORAL
Qty: 90 TABLET | Refills: 4 | Status: SHIPPED | OUTPATIENT
Start: 2023-10-06 | End: 2024-08-21

## 2023-10-06 NOTE — RESULT ENCOUNTER NOTE
Your thyroid is moving in the right direction, but it remains mildly overtreated.  Reduce levothyroxine by taking 1 full tab 5 days a week and one half tab 2 days a week.  Lets recheck your thyroid in about 6 weeks.

## 2023-10-11 ENCOUNTER — MYC MEDICAL ADVICE (OUTPATIENT)
Dept: FAMILY MEDICINE | Facility: CLINIC | Age: 58
End: 2023-10-11
Payer: COMMERCIAL

## 2023-10-12 ENCOUNTER — TELEPHONE (OUTPATIENT)
Dept: FAMILY MEDICINE | Facility: CLINIC | Age: 58
End: 2023-10-12

## 2023-10-12 ENCOUNTER — LAB (OUTPATIENT)
Dept: FAMILY MEDICINE | Facility: CLINIC | Age: 58
End: 2023-10-12

## 2023-10-12 DIAGNOSIS — E03.9 ACQUIRED HYPOTHYROIDISM: ICD-10-CM

## 2023-10-13 ENCOUNTER — ALLIED HEALTH/NURSE VISIT (OUTPATIENT)
Dept: FAMILY MEDICINE | Facility: CLINIC | Age: 58
End: 2023-10-13
Payer: COMMERCIAL

## 2023-10-13 DIAGNOSIS — Z23 FLU VACCINE NEED: Primary | ICD-10-CM

## 2023-10-13 PROCEDURE — 90677 PCV20 VACCINE IM: CPT

## 2023-10-13 PROCEDURE — 90471 IMMUNIZATION ADMIN: CPT

## 2023-10-13 PROCEDURE — 99207 PR NO CHARGE NURSE ONLY: CPT

## 2023-11-21 ENCOUNTER — LAB (OUTPATIENT)
Dept: LAB | Facility: CLINIC | Age: 58
End: 2023-11-21
Payer: COMMERCIAL

## 2023-11-21 DIAGNOSIS — E03.4 HYPOTHYROIDISM DUE TO ACQUIRED ATROPHY OF THYROID: Primary | ICD-10-CM

## 2023-11-21 DIAGNOSIS — E03.9 ACQUIRED HYPOTHYROIDISM: ICD-10-CM

## 2023-11-21 LAB — TSH SERPL DL<=0.005 MIU/L-ACNC: 0.3 UIU/ML (ref 0.3–4.2)

## 2023-11-21 PROCEDURE — 84443 ASSAY THYROID STIM HORMONE: CPT

## 2023-11-21 PROCEDURE — 36415 COLL VENOUS BLD VENIPUNCTURE: CPT

## 2023-12-26 ENCOUNTER — ANCILLARY PROCEDURE (OUTPATIENT)
Dept: MAMMOGRAPHY | Facility: CLINIC | Age: 58
End: 2023-12-26
Attending: FAMILY MEDICINE
Payer: COMMERCIAL

## 2023-12-26 DIAGNOSIS — Z12.31 VISIT FOR SCREENING MAMMOGRAM: ICD-10-CM

## 2023-12-26 PROCEDURE — 77067 SCR MAMMO BI INCL CAD: CPT

## 2024-02-14 ENCOUNTER — PATIENT OUTREACH (OUTPATIENT)
Dept: CARE COORDINATION | Facility: CLINIC | Age: 59
End: 2024-02-14
Payer: COMMERCIAL

## 2024-02-17 DIAGNOSIS — I10 ESSENTIAL HYPERTENSION: ICD-10-CM

## 2024-02-19 RX ORDER — LISINOPRIL AND HYDROCHLOROTHIAZIDE 20; 25 MG/1; MG/1
1 TABLET ORAL DAILY
Qty: 90 TABLET | Refills: 0 | Status: SHIPPED | OUTPATIENT
Start: 2024-02-19 | End: 2024-05-15

## 2024-03-22 ENCOUNTER — LAB (OUTPATIENT)
Dept: LAB | Facility: CLINIC | Age: 59
End: 2024-03-22
Payer: COMMERCIAL

## 2024-03-22 DIAGNOSIS — E03.4 HYPOTHYROIDISM DUE TO ACQUIRED ATROPHY OF THYROID: ICD-10-CM

## 2024-03-22 PROCEDURE — 36415 COLL VENOUS BLD VENIPUNCTURE: CPT

## 2024-03-22 PROCEDURE — 84443 ASSAY THYROID STIM HORMONE: CPT

## 2024-03-23 LAB — TSH SERPL DL<=0.005 MIU/L-ACNC: 3 UIU/ML (ref 0.3–4.2)

## 2024-05-11 ENCOUNTER — HEALTH MAINTENANCE LETTER (OUTPATIENT)
Age: 59
End: 2024-05-11

## 2024-05-15 DIAGNOSIS — I10 ESSENTIAL HYPERTENSION: ICD-10-CM

## 2024-05-15 RX ORDER — LISINOPRIL AND HYDROCHLOROTHIAZIDE 20; 25 MG/1; MG/1
1 TABLET ORAL DAILY
Qty: 90 TABLET | Refills: 0 | Status: SHIPPED | OUTPATIENT
Start: 2024-05-15 | End: 2024-08-12

## 2024-08-01 ENCOUNTER — OFFICE VISIT (OUTPATIENT)
Dept: FAMILY MEDICINE | Facility: CLINIC | Age: 59
End: 2024-08-01
Payer: COMMERCIAL

## 2024-08-01 VITALS
HEIGHT: 67 IN | BODY MASS INDEX: 33.27 KG/M2 | SYSTOLIC BLOOD PRESSURE: 126 MMHG | DIASTOLIC BLOOD PRESSURE: 78 MMHG | WEIGHT: 212 LBS | OXYGEN SATURATION: 97 % | TEMPERATURE: 99.2 F | HEART RATE: 104 BPM | RESPIRATION RATE: 18 BRPM

## 2024-08-01 DIAGNOSIS — R19.7 DIARRHEA, UNSPECIFIED TYPE: ICD-10-CM

## 2024-08-01 DIAGNOSIS — R52 BODY ACHES: ICD-10-CM

## 2024-08-01 DIAGNOSIS — N18.31 STAGE 3A CHRONIC KIDNEY DISEASE (H): ICD-10-CM

## 2024-08-01 DIAGNOSIS — R50.9 FEVER, UNSPECIFIED FEVER CAUSE: ICD-10-CM

## 2024-08-01 DIAGNOSIS — B34.9 VIRAL ILLNESS: Primary | ICD-10-CM

## 2024-08-01 LAB
ALBUMIN UR-MCNC: NEGATIVE MG/DL
ANION GAP SERPL CALCULATED.3IONS-SCNC: 10 MMOL/L (ref 7–15)
APPEARANCE UR: CLEAR
BACTERIA #/AREA URNS HPF: ABNORMAL /HPF
BILIRUB UR QL STRIP: NEGATIVE
BUN SERPL-MCNC: 14.1 MG/DL (ref 6–20)
CALCIUM SERPL-MCNC: 9.2 MG/DL (ref 8.8–10.4)
CHLORIDE SERPL-SCNC: 99 MMOL/L (ref 98–107)
COLOR UR AUTO: YELLOW
CREAT SERPL-MCNC: 1.12 MG/DL (ref 0.51–0.95)
EGFRCR SERPLBLD CKD-EPI 2021: 57 ML/MIN/1.73M2
GLUCOSE SERPL-MCNC: 114 MG/DL (ref 70–99)
GLUCOSE UR STRIP-MCNC: NEGATIVE MG/DL
HCO3 SERPL-SCNC: 27 MMOL/L (ref 22–29)
HGB UR QL STRIP: ABNORMAL
KETONES UR STRIP-MCNC: NEGATIVE MG/DL
LEUKOCYTE ESTERASE UR QL STRIP: NEGATIVE
NITRATE UR QL: NEGATIVE
PH UR STRIP: 7.5 [PH] (ref 5–8)
POTASSIUM SERPL-SCNC: 4.1 MMOL/L (ref 3.4–5.3)
RBC #/AREA URNS AUTO: ABNORMAL /HPF
SARS-COV-2 RNA RESP QL NAA+PROBE: NEGATIVE
SODIUM SERPL-SCNC: 136 MMOL/L (ref 135–145)
SP GR UR STRIP: 1.01 (ref 1–1.03)
SQUAMOUS #/AREA URNS AUTO: ABNORMAL /LPF
TSH SERPL DL<=0.005 MIU/L-ACNC: 2.14 UIU/ML (ref 0.3–4.2)
UROBILINOGEN UR STRIP-ACNC: 0.2 E.U./DL
WBC #/AREA URNS AUTO: ABNORMAL /HPF

## 2024-08-01 PROCEDURE — 36415 COLL VENOUS BLD VENIPUNCTURE: CPT

## 2024-08-01 PROCEDURE — 84443 ASSAY THYROID STIM HORMONE: CPT

## 2024-08-01 PROCEDURE — 87635 SARS-COV-2 COVID-19 AMP PRB: CPT

## 2024-08-01 PROCEDURE — 99213 OFFICE O/P EST LOW 20 MIN: CPT

## 2024-08-01 PROCEDURE — 81001 URINALYSIS AUTO W/SCOPE: CPT

## 2024-08-01 PROCEDURE — 80048 BASIC METABOLIC PNL TOTAL CA: CPT

## 2024-08-01 RX ORDER — SODIUM FLUORIDE1.1%, POTASSIUM NITRATE 5% 57.5; 5.8 MG/ML; MG/ML
GEL, DENTIFRICE DENTAL
COMMUNITY
Start: 2024-03-02

## 2024-08-01 ASSESSMENT — ENCOUNTER SYMPTOMS
DYSURIA: 0
SORE THROAT: 1
DIFFICULTY URINATING: 0
FEVER: 1
HEADACHES: 1
CHILLS: 1
FATIGUE: 1
DIARRHEA: 1
ABDOMINAL PAIN: 1
BLOOD IN STOOL: 0
VOMITING: 0
NAUSEA: 0
SHORTNESS OF BREATH: 0

## 2024-08-01 ASSESSMENT — PAIN SCALES - GENERAL: PAINLEVEL: NO PAIN (0)

## 2024-08-01 NOTE — PROGRESS NOTES
Assessment & Plan     Viral illness  Fever, unspecified fever cause  Diarrhea, unspecified type  Body aches  Suspicious for viral syndrome with acute onset of body aches, diarrhea, fever, and HA. Did recently travel. COVID swab to be collected here. She is worried about UTI, has a co-existing kidney disorder where she has been asymptomatic with UTI in the past, will check urine today. If negative, recommend Rest and fluids. If symptoms persisting for over a week follow-up. If worsening fevers, lethargy, heart racing sensation, or light-headed/dizzy recommend visit sooner. Patient agrees with plan. We did discuss how for the diarrhea we typically give it a few weeks before stool testing, but could consider viral panel if consistent for the next 7 days. No recent exposure to antibiotic and associated with other viral like symptoms so defer C. Diff testing today.   - UA Macroscopic with reflex to Microscopic and Culture - Clinic Collect  - Symptomatic COVID-19 Virus (Coronavirus) by PCR    Stage 3a chronic kidney disease (H)  History of, repeat today in case positive COVID and Paxlovid is needed.   - Basic metabolic panel  (Ca, Cl, CO2, Creat, Gluc, K, Na, BUN); Future    Subjective   Erika is a 59 year old, presenting for the following health issues:  Fever (Patient states fever started yesterday. /Patient states she has body aches. ), Shaking (Patent states shaking started last night. ), Covid (Negative. ), and Diarrhea (Patient states diarrhea has been going on for 3 days. )        8/1/2024     7:49 AM   Additional Questions   Roomed by Austin Hunter MA     History of Present Illness       Reason for visit:  Fever, Diarrhea and Shaking  Symptom onset:  1-3 days ago  Symptoms include:  Body aches chills diarrhea fever  Symptom intensity:  Moderate  Symptom progression:  Staying the same  Had these symptoms before:  Yes  Has tried/received treatment for these symptoms:  Yes  Previous treatment was successful:   "Yes  Prior treatment description:  Antibiotics    She eats 4 or more servings of fruits and vegetables daily.She consumes 0 sweetened beverage(s) daily.She exercises with enough effort to increase her heart rate 20 to 29 minutes per day.  She exercises with enough effort to increase her heart rate 4 days per week.   She is taking medications regularly.     Diarrhea for a few days. Liquid diarrhea. No blood. It is yellowy green.   Headaches, body ache, fevers, chills.   She has not taken anything for fever this am, in the middle of the night she did take something.   Last time this happened she was in sepsis. This happened probably about ten years ago.   No tick bites or exposures that she is aware of.   Nobody else is sick, but did just get back from [place]. Flew by plane.     Review of Systems   Constitutional:  Positive for chills, fatigue and fever.   HENT:  Positive for sore throat.    Respiratory:  Negative for shortness of breath.    Cardiovascular:  Negative for chest pain.   Gastrointestinal:  Positive for abdominal pain and diarrhea. Negative for blood in stool, nausea and vomiting.   Genitourinary:  Negative for difficulty urinating and dysuria.   Musculoskeletal:         Body aches   Neurological:  Positive for headaches.          Objective    /78 (BP Location: Right arm, Patient Position: Sitting, Cuff Size: Adult Regular)   Pulse 104   Temp 99.2  F (37.3  C) (Oral)   Resp 18   Ht 1.702 m (5' 7\")   Wt 96.2 kg (212 lb)   SpO2 97%   BMI 33.20 kg/m    Body mass index is 33.2 kg/m .  Physical Exam  Constitutional:       General: She is not in acute distress.  HENT:      Right Ear: Tympanic membrane, ear canal and external ear normal.      Left Ear: Tympanic membrane, ear canal and external ear normal.   Eyes:      Extraocular Movements: Extraocular movements intact.      Pupils: Pupils are equal, round, and reactive to light.   Cardiovascular:      Rate and Rhythm: Normal rate and regular " rhythm.      Heart sounds: Normal heart sounds.   Pulmonary:      Effort: Pulmonary effort is normal. No respiratory distress.      Breath sounds: Normal breath sounds. No wheezing.   Abdominal:      General: Bowel sounds are normal.      Palpations: Abdomen is soft.      Tenderness: There is abdominal tenderness.   Neurological:      General: No focal deficit present.      Mental Status: She is alert. Mental status is at baseline.   Psychiatric:         Mood and Affect: Mood normal.         Thought Content: Thought content normal.        At the end of the visit, I confirmed understanding of what was discussed. Erika has no further questions or concerns that were brought up at this time.      Kylah Nino DNP, APRN, FNP-C

## 2024-08-05 ENCOUNTER — TELEPHONE (OUTPATIENT)
Dept: FAMILY MEDICINE | Facility: CLINIC | Age: 59
End: 2024-08-05
Payer: COMMERCIAL

## 2024-08-05 NOTE — TELEPHONE ENCOUNTER
Called pt. Pt states that she is doing better finally. She sounded well on the phone as well.     She was really thankful for the call.       Darron Adams Jr., CMA on 8/5/2024 at 4:17 PM

## 2024-08-05 NOTE — TELEPHONE ENCOUNTER
Can we call patient and make sure she is feeling okay. Saw last week for viral illness and patient has a history of sepsis so she was very worried. If she is still feeling sick, this can be expected but should be able to eat, drink, and do some tasks day-to-day, if acutely worse please let me know.       Kylah Nino DNP, APRN, FNP-C

## 2024-08-10 DIAGNOSIS — I10 ESSENTIAL HYPERTENSION: ICD-10-CM

## 2024-08-12 RX ORDER — LISINOPRIL AND HYDROCHLOROTHIAZIDE 20; 25 MG/1; MG/1
1 TABLET ORAL DAILY
Qty: 90 TABLET | Refills: 0 | Status: SHIPPED | OUTPATIENT
Start: 2024-08-12 | End: 2024-08-21

## 2024-08-15 DIAGNOSIS — I10 ESSENTIAL HYPERTENSION: ICD-10-CM

## 2024-08-15 RX ORDER — LISINOPRIL AND HYDROCHLOROTHIAZIDE 20; 25 MG/1; MG/1
1 TABLET ORAL DAILY
Qty: 90 TABLET | Refills: 0 | OUTPATIENT
Start: 2024-08-15

## 2024-08-21 ENCOUNTER — OFFICE VISIT (OUTPATIENT)
Dept: FAMILY MEDICINE | Facility: CLINIC | Age: 59
End: 2024-08-21
Payer: COMMERCIAL

## 2024-08-21 VITALS
RESPIRATION RATE: 13 BRPM | BODY MASS INDEX: 32.56 KG/M2 | HEART RATE: 87 BPM | TEMPERATURE: 98.2 F | SYSTOLIC BLOOD PRESSURE: 133 MMHG | HEIGHT: 66 IN | WEIGHT: 202.6 LBS | DIASTOLIC BLOOD PRESSURE: 81 MMHG

## 2024-08-21 DIAGNOSIS — N39.0 RECURRENT UTI: ICD-10-CM

## 2024-08-21 DIAGNOSIS — N18.31 STAGE 3A CHRONIC KIDNEY DISEASE (H): ICD-10-CM

## 2024-08-21 DIAGNOSIS — Z00.00 ROUTINE PHYSICAL EXAMINATION: Primary | ICD-10-CM

## 2024-08-21 DIAGNOSIS — I10 ESSENTIAL HYPERTENSION: ICD-10-CM

## 2024-08-21 DIAGNOSIS — E03.9 ACQUIRED HYPOTHYROIDISM: ICD-10-CM

## 2024-08-21 LAB
ALBUMIN SERPL BCG-MCNC: 4.6 G/DL (ref 3.5–5.2)
ALP SERPL-CCNC: 79 U/L (ref 40–150)
ALT SERPL W P-5'-P-CCNC: 19 U/L (ref 0–50)
ANION GAP SERPL CALCULATED.3IONS-SCNC: 11 MMOL/L (ref 7–15)
AST SERPL W P-5'-P-CCNC: 25 U/L (ref 0–45)
BILIRUB SERPL-MCNC: 0.4 MG/DL
BUN SERPL-MCNC: 12.8 MG/DL (ref 6–20)
CALCIUM SERPL-MCNC: 9.6 MG/DL (ref 8.8–10.4)
CHLORIDE SERPL-SCNC: 101 MMOL/L (ref 98–107)
CHOLEST SERPL-MCNC: 226 MG/DL
CREAT SERPL-MCNC: 1.12 MG/DL (ref 0.51–0.95)
CREAT UR-MCNC: 46.1 MG/DL
EGFRCR SERPLBLD CKD-EPI 2021: 57 ML/MIN/1.73M2
FASTING STATUS PATIENT QL REPORTED: YES
FASTING STATUS PATIENT QL REPORTED: YES
GLUCOSE SERPL-MCNC: 101 MG/DL (ref 70–99)
HCO3 SERPL-SCNC: 27 MMOL/L (ref 22–29)
HDLC SERPL-MCNC: 58 MG/DL
HGB BLD-MCNC: 13.1 G/DL (ref 11.7–15.7)
LDLC SERPL CALC-MCNC: 147 MG/DL
MICROALBUMIN UR-MCNC: <12 MG/L
MICROALBUMIN/CREAT UR: NORMAL MG/G{CREAT}
NONHDLC SERPL-MCNC: 168 MG/DL
POTASSIUM SERPL-SCNC: 4.2 MMOL/L (ref 3.4–5.3)
PROT SERPL-MCNC: 7.5 G/DL (ref 6.4–8.3)
PTH-INTACT SERPL-MCNC: 27 PG/ML (ref 15–65)
SODIUM SERPL-SCNC: 139 MMOL/L (ref 135–145)
TRIGL SERPL-MCNC: 105 MG/DL
URATE SERPL-MCNC: 5.3 MG/DL (ref 2.4–5.7)
VIT D+METAB SERPL-MCNC: 57 NG/ML (ref 20–50)

## 2024-08-21 PROCEDURE — 85018 HEMOGLOBIN: CPT | Performed by: FAMILY MEDICINE

## 2024-08-21 PROCEDURE — 99214 OFFICE O/P EST MOD 30 MIN: CPT | Mod: 25 | Performed by: FAMILY MEDICINE

## 2024-08-21 PROCEDURE — 80061 LIPID PANEL: CPT | Performed by: FAMILY MEDICINE

## 2024-08-21 PROCEDURE — 36415 COLL VENOUS BLD VENIPUNCTURE: CPT | Performed by: FAMILY MEDICINE

## 2024-08-21 PROCEDURE — 84550 ASSAY OF BLOOD/URIC ACID: CPT | Performed by: FAMILY MEDICINE

## 2024-08-21 PROCEDURE — 83970 ASSAY OF PARATHORMONE: CPT | Performed by: FAMILY MEDICINE

## 2024-08-21 PROCEDURE — 82306 VITAMIN D 25 HYDROXY: CPT | Performed by: FAMILY MEDICINE

## 2024-08-21 PROCEDURE — 80053 COMPREHEN METABOLIC PANEL: CPT | Performed by: FAMILY MEDICINE

## 2024-08-21 PROCEDURE — 82570 ASSAY OF URINE CREATININE: CPT | Performed by: FAMILY MEDICINE

## 2024-08-21 PROCEDURE — 99396 PREV VISIT EST AGE 40-64: CPT | Performed by: FAMILY MEDICINE

## 2024-08-21 PROCEDURE — 82043 UR ALBUMIN QUANTITATIVE: CPT | Performed by: FAMILY MEDICINE

## 2024-08-21 RX ORDER — LEVOTHYROXINE SODIUM 100 UG/1
TABLET ORAL
Qty: 90 TABLET | Refills: 4 | Status: SHIPPED | OUTPATIENT
Start: 2024-08-21

## 2024-08-21 RX ORDER — CIPROFLOXACIN 500 MG/1
500 TABLET, FILM COATED ORAL 2 TIMES DAILY
Qty: 20 TABLET | Refills: 1 | Status: SHIPPED | OUTPATIENT
Start: 2024-08-21

## 2024-08-21 RX ORDER — SODIUM FLUORIDE 5 MG/G
GEL, DENTIFRICE DENTAL
COMMUNITY
Start: 2024-08-07

## 2024-08-21 RX ORDER — LISINOPRIL AND HYDROCHLOROTHIAZIDE 20; 25 MG/1; MG/1
1 TABLET ORAL DAILY
Qty: 90 TABLET | Refills: 4 | Status: SHIPPED | OUTPATIENT
Start: 2024-08-21

## 2024-08-21 ASSESSMENT — PAIN SCALES - GENERAL: PAINLEVEL: NO PAIN (0)

## 2024-08-21 NOTE — PATIENT INSTRUCTIONS
Patient Education   Preventive Care Advice   This is general advice given by our system to help you stay healthy. However, your care team may have specific advice just for you. Please talk to your care team about your preventive care needs.  Nutrition  Eat 5 or more servings of fruits and vegetables each day.  Try wheat bread, brown rice and whole grain pasta (instead of white bread, rice, and pasta).  Get enough calcium and vitamin D. Check the label on foods and aim for 100% of the RDA (recommended daily allowance).  Lifestyle  Exercise at least 150 minutes each week  (30 minutes a day, 5 days a week).  Do muscle strengthening activities 2 days a week. These help control your weight and prevent disease.  No smoking.  Wear sunscreen to prevent skin cancer.  Have a dental exam and cleaning every 6 months.  Yearly exams  See your health care team every year to talk about:  Any changes in your health.  Any medicines your care team has prescribed.  Preventive care, family planning, and ways to prevent chronic diseases.  Shots (vaccines)   HPV shots (up to age 26), if you've never had them before.  Hepatitis B shots (up to age 59), if you've never had them before.  COVID-19 shot: Get this shot when it's due.  Flu shot: Get a flu shot every year.  Tetanus shot: Get a tetanus shot every 10 years.  Pneumococcal, hepatitis A, and RSV shots: Ask your care team if you need these based on your risk.  Shingles shot (for age 50 and up)  General health tests  Diabetes screening:  Starting at age 35, Get screened for diabetes at least every 3 years.  If you are younger than age 35, ask your care team if you should be screened for diabetes.  Cholesterol test: At age 39, start having a cholesterol test every 5 years, or more often if advised.  Bone density scan (DEXA): At age 50, ask your care team if you should have this scan for osteoporosis (brittle bones).  Hepatitis C: Get tested at least once in your life.  STIs (sexually  transmitted infections)  Before age 24: Ask your care team if you should be screened for STIs.  After age 24: Get screened for STIs if you're at risk. You are at risk for STIs (including HIV) if:  You are sexually active with more than one person.  You don't use condoms every time.  You or a partner was diagnosed with a sexually transmitted infection.  If you are at risk for HIV, ask about PrEP medicine to prevent HIV.  Get tested for HIV at least once in your life, whether you are at risk for HIV or not.  Cancer screening tests  Cervical cancer screening: If you have a cervix, begin getting regular cervical cancer screening tests starting at age 21.  Breast cancer scan (mammogram): If you've ever had breasts, begin having regular mammograms starting at age 40. This is a scan to check for breast cancer.  Colon cancer screening: It is important to start screening for colon cancer at age 45.  Have a colonoscopy test every 10 years (or more often if you're at risk) Or, ask your provider about stool tests like a FIT test every year or Cologuard test every 3 years.  To learn more about your testing options, visit:   .  For help making a decision, visit:   https://bit.ly/xz07228.  Prostate cancer screening test: If you have a prostate, ask your care team if a prostate cancer screening test (PSA) at age 55 is right for you.  Lung cancer screening: If you are a current or former smoker ages 50 to 80, ask your care team if ongoing lung cancer screenings are right for you.  For informational purposes only. Not to replace the advice of your health care provider. Copyright   2023 [employer]. All rights reserved. Clinically reviewed by the [facility]. [employer] [id number] - REV 01/24.

## 2024-08-21 NOTE — PROGRESS NOTES
Preventive Care Visit  [facility]  Taniya Blum MD, Family Medicine  Aug 21, 2024      Assessment & Plan     Routine physical examination  Encouraged healthy lifestyle habits including regular exercise, healthy eating habits, and adequate calcium and vitamin D intake.  Will check fasting lipids, fasting glucose.  She is up-to-date with Pap smear screening, is status post LEEP but per gynecology is back to having Paps every 5 years.  She is up-to-date with mammogram screening, colon cancer screening.  Immunizations reviewed and up-to-date.  - Lipid panel reflex to direct LDL Fasting; Future  - Lipid panel reflex to direct LDL Fasting    Hypertension  Stable and controlled on current regimen, refills provided.  Encouraged healthy lifestyle habits.  Will check kidney function and electrolytes.  - Comprehensive metabolic panel; Future  - lisinopril-hydrochlorothiazide (ZESTORETIC) 20-25 MG tablet; Take 1 tablet by mouth daily.  - Comprehensive metabolic panel    Acquired hypothyroidism  Clinically euthyroid.  Continue levothyroxine at current dose, refills provided.  Recent normal TSH level.  - levothyroxine (SYNTHROID/LEVOTHROID) 100 MCG tablet; Take 1 tab by mouth 5 days a week, and one half tab by mouth 2 day/week.    Stage 3a chronic kidney disease (H)  Continue to avoid nephrotoxic agents, continue lisinopril, continue adequate hydration.  We will reassess kidney function and look for complications including hyperparathyroidism, iron deficiency anemia, vitamin deficiency, gout.  - Albumin Random Urine Quantitative with Creat Ratio; Future  - Hemoglobin; Future  - Lipid panel reflex to direct LDL Fasting; Future  - Parathyroid Hormone Intact; Future  - Vitamin D Deficiency; Future  - Uric acid; Future  - Albumin Random Urine Quantitative with Creat Ratio  - Hemoglobin  - Lipid panel reflex to direct LDL Fasting  - Parathyroid Hormone Intact  - Vitamin D Deficiency  - Uric acid    Recurrent  "UTI  No current symptoms.  She does well with having ciprofloxacin on hand which has been recommended by neurology.  Standing orders placed for urinalysis which she will obtain prior to initiation of ciprofloxacin should UTI symptoms occur.  - ciprofloxacin (CIPRO) 500 MG tablet; Take 1 tablet (500 mg) by mouth 2 times daily.  - UA Macroscopic with reflex to Microscopic and Culture; Standing          BMI  Estimated body mass index is 33.03 kg/m  as calculated from the following:    Height as of this encounter: 1.668 m (5' 5.67\").    Weight as of this encounter: 91.9 kg (202 lb 9.6 oz).             Meredith James is a 59 year old, presenting for the following:  Physical (Thyroid- kidney - BP)        8/21/2024     8:26 AM   Additional Questions   Roomed by [facility] Care Directive  Patient does not have a Health Care Directive or Living Will: Patient states has Advance Directive and will bring in a copy to clinic.    Seen today for her routine preventive care visit and review of chronic health conditions.  She has no concerns today.  Recent viral illness, symptoms resolved so she felt very poorly when present.  At the time her TSH level was normal and her basic metabolic panel was stable.  Remains on lisinopril hydrochlorothiazide management of hypertension.  Denies lightheadedness, dizziness, headaches, chest pain, dyspnea, or swelling.  History of chronic kidney disease stage IIIa due for follow-up.  History of recurrent urinary tract infection, has a ciprofloxacin prescription on hand, begins this after she leaves urine specimen if she has UTI symptoms.  History of hypothyroidism, remains compliant with levothyroxine, recent normal TSH.           No data to display                   No data to display                   No data to display                      No data to display                     Today's PHQ-2 Score:       8/21/2024     8:26 AM   PHQ-2 ( 1999 [employer])   Q1: Little interest or pleasure " in doing things 0   Q2: Feeling down, depressed or hopeless 0   PHQ-2 Score 0   Q1: Little interest or pleasure in doing things Not at all   Q2: Feeling down, depressed or hopeless Not at all   PHQ-2 Score 0            No data to display              Social History     Tobacco Use    Smoking status: Never    Smokeless tobacco: Never   Vaping Use    Vaping status: Never Used   Substance Use Topics    Alcohol use: Yes     Alcohol/week: 3.3 standard drinks of alcohol    Drug use: Never             8/16/2024   Breast Cancer Screening   Family history of breast, colon, or ovarian cancer? Yes          8/16/2024   LAST FHS-7 RESULTS   1st degree relative breast or ovarian cancer No   Any relative bilateral breast cancer Yes   Any male have breast cancer No   Any ONE woman have BOTH breast AND ovarian cancer No   Any woman with breast cancer before 50yrs Yes   2 or more relatives with breast AND/OR ovarian cancer Yes   2 or more relatives with breast AND/OR bowel cancer Yes      I reviewed these answers with patient, she has maternal aunt with breast cancer, does not have 2 or more relatives with breast cancer, does not have any relatives with bilateral breast cancer or ovarian cancer     Mammogram Screening - Mammogram every 1-2 years updated in Health Maintenance based on mutual decision making      History of abnormal Pap smear: No - age 30- 64 PAP with HPV every 5 years recommended        2/13/2018    12:00 AM   PAP / HPV   HPV_EXT - HISTORICAL See Scanned Report      ASCVD Risk   The 10-year ASCVD risk score (Goldie MENDEZ, et al., 2019) is: 3.8%    Values used to calculate the score:      Age: 59 years      Sex: Female      Is Non- : No      Diabetic: No      Tobacco smoker: No      Systolic Blood Pressure: 133 mmHg      Is BP treated: Yes      HDL Cholesterol: 58 mg/dL      Total Cholesterol: 226 mg/dL           Reviewed and updated as needed this visit by Provider                    Past  "Medical History:   Diagnosis Date    Bladder infection     Hypertension     Hypothyroidism     Kidney stone      Past Surgical History:   Procedure Laterality Date    COLONOSCOPY      GENITOURINARY SURGERY      OTHER SURGICAL HISTORY      uterine ablation    KY THYROIDECTOMY      Description: Near-Total Thyroidectomy;  Recorded: 09/17/2008;  Comments: due to Graves    TUBAL LIGATION      ZZC LIGATE FALLOPIAN TUBE      Description: Tubal Ligation;  Recorded: 12/27/2007;  Annotations: 1995     OB History   No obstetric history on file.     Lab work is in process  Labs reviewed in EPIC  BP Readings from Last 3 Encounters:   08/21/24 133/81   08/01/24 126/78   08/24/23 122/80    Wt Readings from Last 3 Encounters:   08/21/24 91.9 kg (202 lb 9.6 oz)   08/01/24 96.2 kg (212 lb)   08/24/23 88 kg (194 lb 0.1 oz)                  Patient Active Problem List   Diagnosis    Hypothyroidism    Hypertension    Stage 3a chronic kidney disease (H)    Recurrent UTI    Endometrial polyp    H/O LEEP     Past Surgical History:   Procedure Laterality Date    COLONOSCOPY      GENITOURINARY SURGERY      OTHER SURGICAL HISTORY      uterine ablation    KY THYROIDECTOMY      Description: Near-Total Thyroidectomy;  Recorded: 09/17/2008;  Comments: due to Graves    TUBAL LIGATION      ZZC LIGATE FALLOPIAN TUBE      Description: Tubal Ligation;  Recorded: 12/27/2007;  Annotations: 1995       Social History     Tobacco Use    Smoking status: Never    Smokeless tobacco: Never   Substance Use Topics    Alcohol use: Yes     Alcohol/week: 3.3 standard drinks of alcohol     Family History   Problem Relation Age of Onset    Cancer Mother         lung    Heart Disease Mother     Fibrocystic breast disease Mother         \"entire breasts were cystic\"    Coronary Artery Disease Mother     Hypertension Mother     Hyperlipidemia Mother     Other Cancer Mother         Lung    Cancer Father         lung    Other Cancer Father         Lung    Cancer Brother  "    Heart Disease Brother         evaluating heart (2024)    Mental Illness Brother         Anxiety, PTSD? ([place])    Breast Cancer Maternal Aunt     Breast Cancer Maternal Aunt     Hypertension Brother     Asthma Son     Thyroid Disease Cousin          Current Outpatient Medications   Medication Sig Dispense Refill    ascorbic acid (VITAMIN C) 1000 MG tablet [ASCORBIC ACID (VITAMIN C) 1000 MG TABLET] Take 1,000 mg by mouth 3 (three) times a day.      cholecalciferol, vitamin D3, (VITAMIN D3) 1,000 unit capsule [CHOLECALCIFEROL, VITAMIN D3, (VITAMIN D3) 1,000 UNIT CAPSULE] Take 1,000 Units by mouth daily.      ciprofloxacin (CIPRO) 500 MG tablet Take 1 tablet (500 mg) by mouth 2 times daily. 20 tablet 1    levothyroxine (SYNTHROID/LEVOTHROID) 100 MCG tablet Take 1 tab by mouth 5 days a week, and one half tab by mouth 2 day/week. 90 tablet 4    lisinopril-hydrochlorothiazide (ZESTORETIC) 20-25 MG tablet Take 1 tablet by mouth daily. 90 tablet 4    magnesium citrate solution (NOT currently available)       multivitamin (ONE A DAY) per tablet [MULTIVITAMIN (ONE A DAY) PER TABLET] Take 1 tablet by mouth daily.      OMEGA-3/DHA/EPA/FISH OIL (FISH OIL-OMEGA-3 FATTY ACIDS) 300-1,000 mg capsule [OMEGA-3/DHA/EPA/FISH OIL (FISH OIL-OMEGA-3 FATTY ACIDS) 300-1,000 MG CAPSULE] Take 2 g by mouth daily.      SODIUM FLUORIDE 5000 ENAMEL 1.1-5 % GEL APPLY PEA SIZED AMOUNT TO TOOTHBRUSH AND APPLY TO TEETH. SPIT OUT EXCESS. DO NOT RINSE MOUTH AFTERWARD.      sodium fluoride dental gel (PREVIDENT) 1.1 % GEL topical gel APPLY A THIN BEAD OF GEL TO TOOTHBRUSH AND BRUSH AT BEDTIME FOR AT LEAST 1 MINUTE. EXPECTORATE THOROUGHLY       Allergies   Allergen Reactions    No Known Drug Allergy Unknown     Recent Labs   Lab Test 08/21/24  0919 08/01/24  0824 03/22/24  0702 10/05/23  0656 08/24/23  0926 09/06/22  0738 07/18/22  0932 08/16/21  1628 08/16/21  1628 07/22/20  0744 07/22/20  0744 12/10/19  1542 11/29/19  1422   A1C  --   --   --   " --   --   --  5.4  --  5.3  --  5.4  --   --    *  --   --   --  116* 130* 171*  --  122  --  147*  --   --    HDL 58  --   --   --  57 62 61  --  64  --  67  --   --    TRIG 105  --   --   --  108 124 127  --  150*  --  121  --   --    ALT 19  --   --   --   --   --   --   --   --   --   --  14 19   CR 1.12* 1.12*  --   --  1.06*  --  1.05*  --  0.89  --  1.08 1.13* 1.19*   GFRESTIMATED 57* 57*  --   --  61  --  62  --  74   < > 53* 51* 48*   GFRESTBLACK  --   --   --   --   --   --   --   --   --   --  >60 >60 58*   POTASSIUM 4.2 4.1  --   --  4.0  --  4.1   < > 3.8  --  3.8 3.8 4.3   TSH  --  2.14 3.00   < > 0.03* 1.39 4.25*   < > 2.35  --  3.45  --   --     < > = values in this interval not displayed.          Review of Systems  Constitutional, neuro, ENT, endocrine, pulmonary, cardiac, gastrointestinal, genitourinary, musculoskeletal, integument and psychiatric systems are negative, except as otherwise noted.     Objective    Exam  /81 (BP Location: Right arm, Patient Position: Sitting, Cuff Size: Adult Large)   Pulse 87   Temp 98.2  F (36.8  C) (Oral)   Resp 13   Ht 1.668 m (5' 5.67\")   Wt 91.9 kg (202 lb 9.6 oz)   HC 99 cm (38.98\")   BMI 33.03 kg/m     Estimated body mass index is 33.03 kg/m  as calculated from the following:    Height as of this encounter: 1.668 m (5' 5.67\").    Weight as of this encounter: 91.9 kg (202 lb 9.6 oz).    Physical Exam  Physical Examination: General appearance - alert, well appearing, and in no distress, oriented to person, place, and time and normal appearing weight  Mental status - alert, oriented to person, place, and time, normal mood, behavior, speech, dress, motor activity, and thought processes  Eyes - pupils equal and reactive, extraocular eye movements intact  Ears - bilateral TM's and external ear canals normal  Nose - normal and patent, no erythema, discharge or polyps  Mouth - mucous membranes moist, pharynx normal without lesions  Neck - supple, " no significant adenopathy  Lymphatics - no palpable lymphadenopathy, no hepatosplenomegaly  Chest - clear to auscultation, no wheezes, rales or rhonchi, symmetric air entry  Heart - normal rate, regular rhythm, normal S1, S2, no murmurs, rubs, clicks or gallops  Abdomen - soft, nontender, nondistended, no masses or organomegaly  Breasts - breasts appear normal, no suspicious masses, no skin or nipple changes or axillary nodes  Neurological - alert, oriented, normal speech, no focal findings or movement disorder noted  Musculoskeletal - no joint tenderness, deformity or swelling  Extremities - peripheral pulses normal, no pedal edema, no clubbing or cyanosis  Skin - normal coloration and turgor, no rashes, no suspicious skin lesions noted          Signed Electronically by: Taniya Bulm MD

## 2024-12-27 ENCOUNTER — ANCILLARY PROCEDURE (OUTPATIENT)
Dept: MAMMOGRAPHY | Facility: CLINIC | Age: 59
End: 2024-12-27
Attending: FAMILY MEDICINE
Payer: COMMERCIAL

## 2024-12-27 DIAGNOSIS — Z12.31 VISIT FOR SCREENING MAMMOGRAM: ICD-10-CM

## 2024-12-27 PROCEDURE — 77067 SCR MAMMO BI INCL CAD: CPT

## 2024-12-27 PROCEDURE — 77063 BREAST TOMOSYNTHESIS BI: CPT

## 2025-04-20 NOTE — TELEPHONE ENCOUNTER
Patient returning his call.  The patient was informed below.  The precautions at this time.  Patient will contact to schedule 6-week recheck.  
Writer called patient and left voicemail to return call to clinic.    If patient returns call please relay below results per PCP.    Please route to nurse queue if patient has any further questions or concerns    KELL Nina, RN  [facility]      ----- Message from Taniya Blum MD sent at 10/6/2023  8:49 AM CDT -----  Your thyroid is moving in the right direction, but it remains mildly overtreated.  Reduce levothyroxine by taking 1 full tab 5 days a week and one half tab 2 days a week.  Lets recheck your thyroid in about 6 weeks.  
Yes

## 2025-08-16 SDOH — HEALTH STABILITY: PHYSICAL HEALTH: ON AVERAGE, HOW MANY MINUTES DO YOU ENGAGE IN EXERCISE AT THIS LEVEL?: 50 MIN

## 2025-08-16 SDOH — HEALTH STABILITY: PHYSICAL HEALTH: ON AVERAGE, HOW MANY DAYS PER WEEK DO YOU ENGAGE IN MODERATE TO STRENUOUS EXERCISE (LIKE A BRISK WALK)?: 1 DAY

## 2025-08-16 ASSESSMENT — SOCIAL DETERMINANTS OF HEALTH (SDOH): HOW OFTEN DO YOU GET TOGETHER WITH FRIENDS OR RELATIVES?: THREE TIMES A WEEK

## 2025-08-21 ENCOUNTER — OFFICE VISIT (OUTPATIENT)
Dept: FAMILY MEDICINE | Facility: CLINIC | Age: 60
End: 2025-08-21
Attending: FAMILY MEDICINE
Payer: COMMERCIAL

## 2025-08-21 VITALS
OXYGEN SATURATION: 97 % | DIASTOLIC BLOOD PRESSURE: 86 MMHG | WEIGHT: 210 LBS | BODY MASS INDEX: 33.75 KG/M2 | HEART RATE: 89 BPM | RESPIRATION RATE: 16 BRPM | TEMPERATURE: 98 F | SYSTOLIC BLOOD PRESSURE: 136 MMHG | HEIGHT: 66 IN

## 2025-08-21 DIAGNOSIS — F41.9 ANXIETY: ICD-10-CM

## 2025-08-21 DIAGNOSIS — N39.0 RECURRENT UTI: ICD-10-CM

## 2025-08-21 DIAGNOSIS — I10 ESSENTIAL HYPERTENSION: ICD-10-CM

## 2025-08-21 DIAGNOSIS — E66.811 CLASS 1 OBESITY WITH SERIOUS COMORBIDITY AND BODY MASS INDEX (BMI) OF 34.0 TO 34.9 IN ADULT, UNSPECIFIED OBESITY TYPE: ICD-10-CM

## 2025-08-21 DIAGNOSIS — N18.31 STAGE 3A CHRONIC KIDNEY DISEASE (H): ICD-10-CM

## 2025-08-21 DIAGNOSIS — E03.9 ACQUIRED HYPOTHYROIDISM: ICD-10-CM

## 2025-08-21 DIAGNOSIS — Z00.00 ROUTINE PHYSICAL EXAMINATION: Primary | ICD-10-CM

## 2025-08-21 LAB
ALBUMIN SERPL BCG-MCNC: 4.6 G/DL (ref 3.5–5.2)
ALBUMIN UR-MCNC: NEGATIVE MG/DL
ALP SERPL-CCNC: 81 U/L (ref 40–150)
ALT SERPL W P-5'-P-CCNC: 14 U/L (ref 0–50)
ANION GAP SERPL CALCULATED.3IONS-SCNC: 11 MMOL/L (ref 7–15)
APPEARANCE UR: CLEAR
AST SERPL W P-5'-P-CCNC: 21 U/L (ref 0–45)
BACTERIA #/AREA URNS HPF: ABNORMAL /HPF
BILIRUB SERPL-MCNC: 0.4 MG/DL
BILIRUB UR QL STRIP: NEGATIVE
BUN SERPL-MCNC: 14.9 MG/DL (ref 6–20)
CALCIUM SERPL-MCNC: 10.2 MG/DL (ref 8.8–10.4)
CHLORIDE SERPL-SCNC: 102 MMOL/L (ref 98–107)
CHOLEST SERPL-MCNC: 253 MG/DL
COLOR UR AUTO: YELLOW
CREAT SERPL-MCNC: 1.21 MG/DL (ref 0.51–0.95)
CREAT UR-MCNC: 193 MG/DL
EGFRCR SERPLBLD CKD-EPI 2021: 52 ML/MIN/1.73M2
FASTING STATUS PATIENT QL REPORTED: YES
FASTING STATUS PATIENT QL REPORTED: YES
GLUCOSE SERPL-MCNC: 107 MG/DL (ref 70–99)
GLUCOSE UR STRIP-MCNC: NEGATIVE MG/DL
HCO3 SERPL-SCNC: 27 MMOL/L (ref 22–29)
HDLC SERPL-MCNC: 75 MG/DL
HGB BLD-MCNC: 13.2 G/DL (ref 11.7–15.7)
HGB UR QL STRIP: ABNORMAL
KETONES UR STRIP-MCNC: NEGATIVE MG/DL
LDLC SERPL CALC-MCNC: 155 MG/DL
LEUKOCYTE ESTERASE UR QL STRIP: NEGATIVE
MCV RBC AUTO: 87.9 FL (ref 78–100)
MICROALBUMIN UR-MCNC: <12 MG/L
MICROALBUMIN/CREAT UR: NORMAL MG/G{CREAT}
NITRATE UR QL: NEGATIVE
NONHDLC SERPL-MCNC: 178 MG/DL
PH UR STRIP: 7 [PH] (ref 5–8)
POTASSIUM SERPL-SCNC: 4 MMOL/L (ref 3.4–5.3)
PROT SERPL-MCNC: 7.8 G/DL (ref 6.4–8.3)
RBC #/AREA URNS AUTO: ABNORMAL /HPF
SODIUM SERPL-SCNC: 140 MMOL/L (ref 135–145)
SP GR UR STRIP: 1.02 (ref 1–1.03)
SQUAMOUS #/AREA URNS AUTO: ABNORMAL /LPF
TRIGL SERPL-MCNC: 115 MG/DL
TSH SERPL DL<=0.005 MIU/L-ACNC: 2.56 UIU/ML (ref 0.3–4.2)
UROBILINOGEN UR STRIP-ACNC: 0.2 E.U./DL
WBC #/AREA URNS AUTO: ABNORMAL /HPF

## 2025-08-21 PROCEDURE — 36415 COLL VENOUS BLD VENIPUNCTURE: CPT | Performed by: FAMILY MEDICINE

## 2025-08-21 PROCEDURE — 99396 PREV VISIT EST AGE 40-64: CPT | Performed by: FAMILY MEDICINE

## 2025-08-21 PROCEDURE — 3079F DIAST BP 80-89 MM HG: CPT | Performed by: FAMILY MEDICINE

## 2025-08-21 PROCEDURE — 99214 OFFICE O/P EST MOD 30 MIN: CPT | Mod: 25 | Performed by: FAMILY MEDICINE

## 2025-08-21 PROCEDURE — 81001 URINALYSIS AUTO W/SCOPE: CPT | Performed by: FAMILY MEDICINE

## 2025-08-21 PROCEDURE — 80053 COMPREHEN METABOLIC PANEL: CPT | Performed by: FAMILY MEDICINE

## 2025-08-21 PROCEDURE — 1126F AMNT PAIN NOTED NONE PRSNT: CPT | Performed by: FAMILY MEDICINE

## 2025-08-21 PROCEDURE — 3050F LDL-C >= 130 MG/DL: CPT | Performed by: FAMILY MEDICINE

## 2025-08-21 PROCEDURE — 82043 UR ALBUMIN QUANTITATIVE: CPT | Performed by: FAMILY MEDICINE

## 2025-08-21 PROCEDURE — G2211 COMPLEX E/M VISIT ADD ON: HCPCS | Performed by: FAMILY MEDICINE

## 2025-08-21 PROCEDURE — 80061 LIPID PANEL: CPT | Performed by: FAMILY MEDICINE

## 2025-08-21 PROCEDURE — 3075F SYST BP GE 130 - 139MM HG: CPT | Performed by: FAMILY MEDICINE

## 2025-08-21 PROCEDURE — 82570 ASSAY OF URINE CREATININE: CPT | Performed by: FAMILY MEDICINE

## 2025-08-21 PROCEDURE — 85018 HEMOGLOBIN: CPT | Performed by: FAMILY MEDICINE

## 2025-08-21 PROCEDURE — 84443 ASSAY THYROID STIM HORMONE: CPT | Performed by: FAMILY MEDICINE

## 2025-08-21 RX ORDER — CIPROFLOXACIN 500 MG/1
500 TABLET, FILM COATED ORAL 2 TIMES DAILY
Qty: 20 TABLET | Refills: 3 | Status: SHIPPED | OUTPATIENT
Start: 2025-08-21

## 2025-08-21 RX ORDER — LEVOTHYROXINE SODIUM 100 UG/1
TABLET ORAL
Qty: 90 TABLET | Refills: 4 | Status: SHIPPED | OUTPATIENT
Start: 2025-08-21

## 2025-08-21 RX ORDER — LISINOPRIL AND HYDROCHLOROTHIAZIDE 20; 25 MG/1; MG/1
1 TABLET ORAL DAILY
Qty: 90 TABLET | Refills: 4 | Status: SHIPPED | OUTPATIENT
Start: 2025-08-21

## 2025-08-21 RX ORDER — HYDROXYZINE HYDROCHLORIDE 25 MG/1
25 TABLET, FILM COATED ORAL 3 TIMES DAILY PRN
Qty: 20 TABLET | Refills: 1 | Status: SHIPPED | OUTPATIENT
Start: 2025-08-21

## 2025-08-21 ASSESSMENT — PAIN SCALES - GENERAL: PAINLEVEL_OUTOF10: NO PAIN (0)

## 2025-08-21 ASSESSMENT — ENCOUNTER SYMPTOMS: NERVOUS/ANXIOUS: 1
